# Patient Record
Sex: FEMALE | Race: WHITE | NOT HISPANIC OR LATINO | Employment: FULL TIME | URBAN - METROPOLITAN AREA
[De-identification: names, ages, dates, MRNs, and addresses within clinical notes are randomized per-mention and may not be internally consistent; named-entity substitution may affect disease eponyms.]

---

## 2017-06-13 ENCOUNTER — APPOINTMENT (OUTPATIENT)
Dept: WOMENS IMAGING | Facility: HOSPITAL | Age: 42
End: 2017-06-13

## 2017-06-13 PROCEDURE — 77067 SCR MAMMO BI INCL CAD: CPT | Performed by: RADIOLOGY

## 2017-06-13 PROCEDURE — 77063 BREAST TOMOSYNTHESIS BI: CPT | Performed by: RADIOLOGY

## 2019-12-06 ENCOUNTER — LAB REQUISITION (OUTPATIENT)
Dept: LAB | Facility: OTHER | Age: 44
End: 2019-12-06

## 2019-12-06 DIAGNOSIS — Z02.1 PRE-EMPLOYMENT EXAMINATION: ICD-10-CM

## 2019-12-06 LAB
ALBUMIN SERPL-MCNC: 4.9 G/DL (ref 3.5–5.2)
ALBUMIN SERPL-MCNC: 4.9 G/DL (ref 3.5–5.2)
ALBUMIN/GLOB SERPL: 1.8 G/DL
ALP SERPL-CCNC: 34 U/L (ref 39–117)
ALP SERPL-CCNC: 34 U/L (ref 39–117)
ALT SERPL W P-5'-P-CCNC: 17 U/L (ref 1–33)
ALT SERPL W P-5'-P-CCNC: 17 U/L (ref 1–33)
AST SERPL-CCNC: 17 U/L (ref 1–32)
AST SERPL-CCNC: 17 U/L (ref 1–32)
BACTERIA UR QL AUTO: ABNORMAL /HPF
BASOPHILS # BLD AUTO: 0.04 10*3/MM3 (ref 0–0.2)
BASOPHILS NFR BLD AUTO: 0.8 % (ref 0–1.5)
BILIRUB CONJ SERPL-MCNC: <0.2 MG/DL (ref 0.2–0.3)
BILIRUB CONJ SERPL-MCNC: <0.2 MG/DL (ref 0.2–0.3)
BILIRUB INDIRECT SERPL-MCNC: ABNORMAL MG/DL
BILIRUB SERPL-MCNC: 0.4 MG/DL (ref 0.2–1.2)
BILIRUB SERPL-MCNC: 0.4 MG/DL (ref 0.2–1.2)
BILIRUB UR QL STRIP: NEGATIVE
BUN BLD-MCNC: 10 MG/DL (ref 6–20)
CALCIUM SPEC-SCNC: 9.4 MG/DL (ref 8.6–10.5)
CHLORIDE SERPL-SCNC: 99 MMOL/L (ref 98–107)
CHOLEST SERPL-MCNC: 194 MG/DL (ref 0–200)
CLARITY UR: CLEAR
CO2 SERPL-SCNC: 27.7 MMOL/L (ref 22–29)
COLOR UR: YELLOW
CREAT BLD-MCNC: 0.76 MG/DL (ref 0.57–1)
DEPRECATED RDW RBC AUTO: 41.6 FL (ref 37–54)
EOSINOPHIL # BLD AUTO: 0.11 10*3/MM3 (ref 0–0.4)
EOSINOPHIL NFR BLD AUTO: 2.2 % (ref 0.3–6.2)
ERYTHROCYTE [DISTWIDTH] IN BLOOD BY AUTOMATED COUNT: 12.8 % (ref 12.3–15.4)
GFR SERPL CREATININE-BSD FRML MDRD: 82 ML/MIN/1.73
GGT SERPL-CCNC: 32 U/L (ref 5–36)
GLOBULIN UR ELPH-MCNC: 2.7 GM/DL
GLUCOSE BLD-MCNC: 88 MG/DL (ref 65–99)
GLUCOSE UR STRIP-MCNC: NEGATIVE MG/DL
HCT VFR BLD AUTO: 41.2 % (ref 34–46.6)
HDLC SERPL-MCNC: 72 MG/DL (ref 40–60)
HGB BLD-MCNC: 14 G/DL (ref 12–15.9)
HGB UR QL STRIP.AUTO: ABNORMAL
HYALINE CASTS UR QL AUTO: ABNORMAL /LPF
IMM GRANULOCYTES # BLD AUTO: 0.02 10*3/MM3 (ref 0–0.05)
IMM GRANULOCYTES NFR BLD AUTO: 0.4 % (ref 0–0.5)
IRON 24H UR-MRATE: 155 MCG/DL (ref 37–145)
KETONES UR QL STRIP: NEGATIVE
LDH SERPL-CCNC: 210 U/L (ref 135–214)
LDLC SERPL CALC-MCNC: 110 MG/DL (ref 0–100)
LDLC/HDLC SERPL: 1.53 {RATIO}
LEUKOCYTE ESTERASE UR QL STRIP.AUTO: ABNORMAL
LYMPHOCYTES # BLD AUTO: 1.2 10*3/MM3 (ref 0.7–3.1)
LYMPHOCYTES NFR BLD AUTO: 24.3 % (ref 19.6–45.3)
MCH RBC QN AUTO: 30.4 PG (ref 26.6–33)
MCHC RBC AUTO-ENTMCNC: 34 G/DL (ref 31.5–35.7)
MCV RBC AUTO: 89.6 FL (ref 79–97)
MONOCYTES # BLD AUTO: 0.34 10*3/MM3 (ref 0.1–0.9)
MONOCYTES NFR BLD AUTO: 6.9 % (ref 5–12)
NEUTROPHILS # BLD AUTO: 3.23 10*3/MM3 (ref 1.7–7)
NEUTROPHILS NFR BLD AUTO: 65.4 % (ref 42.7–76)
NITRITE UR QL STRIP: NEGATIVE
NRBC BLD AUTO-RTO: 0 /100 WBC (ref 0–0.2)
PH UR STRIP.AUTO: 7.5 [PH] (ref 5–8)
PHOSPHATE SERPL-MCNC: 3.7 MG/DL (ref 2.5–4.5)
PLATELET # BLD AUTO: 334 10*3/MM3 (ref 140–450)
PMV BLD AUTO: 10.4 FL (ref 6–12)
POTASSIUM BLD-SCNC: 4 MMOL/L (ref 3.5–5.2)
PROT SERPL-MCNC: 7.6 G/DL (ref 6–8.5)
PROT SERPL-MCNC: 7.6 G/DL (ref 6–8.5)
PROT UR QL STRIP: NEGATIVE
RBC # BLD AUTO: 4.6 10*6/MM3 (ref 3.77–5.28)
RBC # UR: ABNORMAL /HPF
REF LAB TEST METHOD: ABNORMAL
SODIUM BLD-SCNC: 138 MMOL/L (ref 136–145)
SP GR UR STRIP: 1.01 (ref 1–1.03)
SQUAMOUS #/AREA URNS HPF: ABNORMAL /HPF
TRIGL SERPL-MCNC: 60 MG/DL (ref 0–150)
URATE SERPL-MCNC: 4 MG/DL (ref 2.4–5.7)
UROBILINOGEN UR QL STRIP: ABNORMAL
VLDLC SERPL-MCNC: 12 MG/DL (ref 5–40)
WBC NRBC COR # BLD: 4.94 10*3/MM3 (ref 3.4–10.8)
WBC UR QL AUTO: ABNORMAL /HPF

## 2019-12-06 PROCEDURE — 83825 ASSAY OF MERCURY: CPT | Performed by: EMERGENCY MEDICINE

## 2019-12-06 PROCEDURE — 85025 COMPLETE CBC W/AUTO DIFF WBC: CPT | Performed by: EMERGENCY MEDICINE

## 2019-12-06 PROCEDURE — 80053 COMPREHEN METABOLIC PANEL: CPT | Performed by: EMERGENCY MEDICINE

## 2019-12-06 PROCEDURE — 82175 ASSAY OF ARSENIC: CPT | Performed by: EMERGENCY MEDICINE

## 2019-12-06 PROCEDURE — 80076 HEPATIC FUNCTION PANEL: CPT | Performed by: EMERGENCY MEDICINE

## 2019-12-06 PROCEDURE — 83655 ASSAY OF LEAD: CPT | Performed by: EMERGENCY MEDICINE

## 2019-12-06 PROCEDURE — 80061 LIPID PANEL: CPT | Performed by: EMERGENCY MEDICINE

## 2019-12-06 PROCEDURE — 81001 URINALYSIS AUTO W/SCOPE: CPT | Performed by: EMERGENCY MEDICINE

## 2019-12-10 LAB
ARSENIC BLD-MCNC: 5 UG/L (ref 2–23)
LEAD BLD-MCNC: 1 UG/DL (ref 0–4)
MERCURY BLD-MCNC: NORMAL UG/L (ref 0–14.9)

## 2020-10-06 ENCOUNTER — LAB (OUTPATIENT)
Dept: LAB | Facility: HOSPITAL | Age: 45
End: 2020-10-06

## 2020-10-06 ENCOUNTER — OFFICE VISIT (OUTPATIENT)
Dept: INTERNAL MEDICINE | Facility: CLINIC | Age: 45
End: 2020-10-06

## 2020-10-06 VITALS
HEART RATE: 90 BPM | WEIGHT: 159 LBS | HEIGHT: 67 IN | SYSTOLIC BLOOD PRESSURE: 164 MMHG | DIASTOLIC BLOOD PRESSURE: 100 MMHG | BODY MASS INDEX: 24.96 KG/M2 | OXYGEN SATURATION: 100 %

## 2020-10-06 DIAGNOSIS — Z00.00 HEALTHCARE MAINTENANCE: ICD-10-CM

## 2020-10-06 DIAGNOSIS — J30.1 SEASONAL ALLERGIC RHINITIS DUE TO POLLEN: ICD-10-CM

## 2020-10-06 DIAGNOSIS — F41.9 ANXIETY: Primary | ICD-10-CM

## 2020-10-06 DIAGNOSIS — Z23 NEED FOR INFLUENZA VACCINATION: ICD-10-CM

## 2020-10-06 DIAGNOSIS — I10 ESSENTIAL HYPERTENSION: ICD-10-CM

## 2020-10-06 LAB
ALBUMIN SERPL-MCNC: 4.7 G/DL (ref 3.5–5.2)
ALBUMIN/GLOB SERPL: 1.7 G/DL
ALP SERPL-CCNC: 41 U/L (ref 39–117)
ALT SERPL W P-5'-P-CCNC: 18 U/L (ref 1–33)
ANION GAP SERPL CALCULATED.3IONS-SCNC: 8.9 MMOL/L (ref 5–15)
AST SERPL-CCNC: 18 U/L (ref 1–32)
BASOPHILS # BLD AUTO: 0.05 10*3/MM3 (ref 0–0.2)
BASOPHILS NFR BLD AUTO: 0.9 % (ref 0–1.5)
BILIRUB SERPL-MCNC: 0.5 MG/DL (ref 0–1.2)
BUN SERPL-MCNC: 10 MG/DL (ref 6–20)
BUN/CREAT SERPL: 11.5 (ref 7–25)
CALCIUM SPEC-SCNC: 10 MG/DL (ref 8.6–10.5)
CHLORIDE SERPL-SCNC: 102 MMOL/L (ref 98–107)
CHOLEST SERPL-MCNC: 218 MG/DL (ref 0–200)
CO2 SERPL-SCNC: 28.1 MMOL/L (ref 22–29)
CREAT SERPL-MCNC: 0.87 MG/DL (ref 0.57–1)
DEPRECATED RDW RBC AUTO: 38.4 FL (ref 37–54)
EOSINOPHIL # BLD AUTO: 0.09 10*3/MM3 (ref 0–0.4)
EOSINOPHIL NFR BLD AUTO: 1.6 % (ref 0.3–6.2)
ERYTHROCYTE [DISTWIDTH] IN BLOOD BY AUTOMATED COUNT: 12 % (ref 12.3–15.4)
GFR SERPL CREATININE-BSD FRML MDRD: 70 ML/MIN/1.73
GLOBULIN UR ELPH-MCNC: 2.7 GM/DL
GLUCOSE SERPL-MCNC: 84 MG/DL (ref 65–99)
HCT VFR BLD AUTO: 43.5 % (ref 34–46.6)
HCV AB SER DONR QL: NORMAL
HDLC SERPL-MCNC: 88 MG/DL (ref 40–60)
HGB BLD-MCNC: 15.1 G/DL (ref 12–15.9)
IMM GRANULOCYTES # BLD AUTO: 0.02 10*3/MM3 (ref 0–0.05)
IMM GRANULOCYTES NFR BLD AUTO: 0.3 % (ref 0–0.5)
LDLC SERPL CALC-MCNC: 118 MG/DL (ref 0–100)
LDLC/HDLC SERPL: 1.34 {RATIO}
LYMPHOCYTES # BLD AUTO: 0.86 10*3/MM3 (ref 0.7–3.1)
LYMPHOCYTES NFR BLD AUTO: 15 % (ref 19.6–45.3)
MAGNESIUM SERPL-MCNC: 2.1 MG/DL (ref 1.6–2.6)
MCH RBC QN AUTO: 30.8 PG (ref 26.6–33)
MCHC RBC AUTO-ENTMCNC: 34.7 G/DL (ref 31.5–35.7)
MCV RBC AUTO: 88.8 FL (ref 79–97)
MONOCYTES # BLD AUTO: 0.42 10*3/MM3 (ref 0.1–0.9)
MONOCYTES NFR BLD AUTO: 7.3 % (ref 5–12)
NEUTROPHILS NFR BLD AUTO: 4.28 10*3/MM3 (ref 1.7–7)
NEUTROPHILS NFR BLD AUTO: 74.9 % (ref 42.7–76)
NRBC BLD AUTO-RTO: 0 /100 WBC (ref 0–0.2)
PLATELET # BLD AUTO: 317 10*3/MM3 (ref 140–450)
PMV BLD AUTO: 10 FL (ref 6–12)
POTASSIUM SERPL-SCNC: 4 MMOL/L (ref 3.5–5.2)
PROT SERPL-MCNC: 7.4 G/DL (ref 6–8.5)
RBC # BLD AUTO: 4.9 10*6/MM3 (ref 3.77–5.28)
SODIUM SERPL-SCNC: 139 MMOL/L (ref 136–145)
T4 FREE SERPL-MCNC: 1.31 NG/DL (ref 0.93–1.7)
TRIGL SERPL-MCNC: 60 MG/DL (ref 0–150)
TSH SERPL DL<=0.05 MIU/L-ACNC: 1.26 UIU/ML (ref 0.27–4.2)
VLDLC SERPL-MCNC: 12 MG/DL (ref 5–40)
WBC # BLD AUTO: 5.72 10*3/MM3 (ref 3.4–10.8)

## 2020-10-06 PROCEDURE — 85025 COMPLETE CBC W/AUTO DIFF WBC: CPT | Performed by: FAMILY MEDICINE

## 2020-10-06 PROCEDURE — 86803 HEPATITIS C AB TEST: CPT | Performed by: FAMILY MEDICINE

## 2020-10-06 PROCEDURE — 99214 OFFICE O/P EST MOD 30 MIN: CPT | Performed by: FAMILY MEDICINE

## 2020-10-06 PROCEDURE — 83735 ASSAY OF MAGNESIUM: CPT | Performed by: FAMILY MEDICINE

## 2020-10-06 PROCEDURE — 84439 ASSAY OF FREE THYROXINE: CPT | Performed by: FAMILY MEDICINE

## 2020-10-06 PROCEDURE — 90686 IIV4 VACC NO PRSV 0.5 ML IM: CPT | Performed by: FAMILY MEDICINE

## 2020-10-06 PROCEDURE — 36415 COLL VENOUS BLD VENIPUNCTURE: CPT | Performed by: FAMILY MEDICINE

## 2020-10-06 PROCEDURE — 80053 COMPREHEN METABOLIC PANEL: CPT | Performed by: FAMILY MEDICINE

## 2020-10-06 PROCEDURE — 84443 ASSAY THYROID STIM HORMONE: CPT | Performed by: FAMILY MEDICINE

## 2020-10-06 PROCEDURE — 80061 LIPID PANEL: CPT | Performed by: FAMILY MEDICINE

## 2020-10-06 PROCEDURE — 99386 PREV VISIT NEW AGE 40-64: CPT | Performed by: FAMILY MEDICINE

## 2020-10-06 PROCEDURE — 90471 IMMUNIZATION ADMIN: CPT | Performed by: FAMILY MEDICINE

## 2020-10-06 RX ORDER — FLUTICASONE PROPIONATE 50 MCG
1 SPRAY, SUSPENSION (ML) NASAL DAILY
COMMUNITY

## 2020-10-06 RX ORDER — DIPHENHYDRAMINE HCL 25 MG
1 CAPSULE ORAL NIGHTLY
COMMUNITY

## 2020-10-06 RX ORDER — VENLAFAXINE HYDROCHLORIDE 37.5 MG/1
37.5 CAPSULE, EXTENDED RELEASE ORAL DAILY
Qty: 21 CAPSULE | Refills: 0 | Status: SHIPPED | OUTPATIENT
Start: 2020-10-06 | End: 2020-11-22 | Stop reason: SINTOL

## 2020-10-06 RX ORDER — ATENOLOL 25 MG/1
12.5 TABLET ORAL DAILY
Qty: 45 TABLET | Refills: 2 | Status: SHIPPED | OUTPATIENT
Start: 2020-10-06 | End: 2021-03-15 | Stop reason: SDUPTHER

## 2020-10-06 RX ORDER — VENLAFAXINE HYDROCHLORIDE 75 MG/1
75 CAPSULE, EXTENDED RELEASE ORAL DAILY
Qty: 30 CAPSULE | Refills: 6 | Status: SHIPPED | OUTPATIENT
Start: 2020-10-27 | End: 2020-11-22 | Stop reason: SINTOL

## 2020-10-06 NOTE — PROGRESS NOTES
Subjective   Alla Hinson is a 44 y.o. female.     Chief Complaint   Patient presents with   • Anxiety   Healthcare maintenance      History of Present Illness   Alla Hinson 44 y.o. female who presents for an Annual Wellness Visit.  she has a history of   Patient Active Problem List   Diagnosis   • Essential hypertension   • Anxiety   • Eczema   • Seasonal allergic rhinitis due to pollen   • Healthcare maintenance   .  she has been feeling anxious.  Labs results discussed in detail with the patient.  Plan to update vaccines if needed today.  I  reviewed health maintenance with her as part of my preventative care plan.    Health Habits:  Dental Exam. up to date  Eye Exam. up to date  Exercise: 0 times/week.  Current exercise activities include: outdoor activity  \    The following portions of the patient's history were reviewed and updated as appropriate: allergies, current medications, past family history, past medical history, past social history, past surgical history and problem list.    Review of Systems   Constitutional: Negative.    HENT: Negative.    Respiratory: Negative.    Cardiovascular: Positive for palpitations.   Gastrointestinal: Negative.    Genitourinary: Negative.    Musculoskeletal: Negative.    Skin: Positive for rash.   Neurological: Negative.    Psychiatric/Behavioral: Positive for decreased concentration. The patient is nervous/anxious.        Objective   Physical Exam  Vitals signs and nursing note reviewed.   Constitutional:       Appearance: Normal appearance.   HENT:      Head: Normocephalic and atraumatic.      Right Ear: Tympanic membrane and ear canal normal.      Left Ear: Tympanic membrane and ear canal normal.   Eyes:      Extraocular Movements: Extraocular movements intact.      Pupils: Pupils are equal, round, and reactive to light.   Neck:      Musculoskeletal: No muscular tenderness.      Vascular: No carotid bruit.   Cardiovascular:      Rate and Rhythm: Normal rate  and regular rhythm.      Pulses: Normal pulses.   Pulmonary:      Effort: Pulmonary effort is normal.      Breath sounds: Normal breath sounds.   Musculoskeletal:      Right lower leg: No edema.      Left lower leg: No edema.   Skin:     General: Skin is warm and dry.   Neurological:      General: No focal deficit present.      Mental Status: She is alert.   Psychiatric:         Mood and Affect: Mood normal.         Behavior: Behavior normal.         Thought Content: Thought content normal.         Judgment: Judgment normal.         Assessment/Plan   Alla was seen today for anxiety.    Diagnoses and all orders for this visit:    Anxiety  -     CBC & Differential  -     venlafaxine XR (Effexor XR) 37.5 MG 24 hr capsule; Take 1 capsule by mouth Daily.  -     venlafaxine XR (Effexor XR) 75 MG 24 hr capsule; Take 1 capsule by mouth Daily.  -     atenolol (Tenormin) 25 MG tablet; Take 0.5 tablets by mouth Daily.    Essential hypertension  -     Comprehensive Metabolic Panel  -     Magnesium  -     TSH  -     T4, Free  -     atenolol (Tenormin) 25 MG tablet; Take 0.5 tablets by mouth Daily.    Seasonal allergic rhinitis due to pollen    Healthcare maintenance  -     Lipid Panel  -     Hepatitis C Antibody  -     Cologuard - Stool, Per Rectum; Future    Other orders  -     Fluarix/Fluzone/Afluria Quad>6 Months      Any attempts at healthy lifestyle with calorie appropriate diet regular physical activity preventative services  She has mammogram and Pap smear through gynecology

## 2020-10-06 NOTE — PROGRESS NOTES
Alla Hinson is a 44 y.o. female.      Assessment/Plan   Problem List Items Addressed This Visit        Cardiovascular and Mediastinum    Essential hypertension    Relevant Medications    atenolol (Tenormin) 25 MG tablet    Other Relevant Orders    Comprehensive Metabolic Panel    Magnesium    TSH    T4, Free       Respiratory    Seasonal allergic rhinitis due to pollen       Other    Anxiety - Primary    Relevant Medications    venlafaxine XR (Effexor XR) 37.5 MG 24 hr capsule    venlafaxine XR (Effexor XR) 75 MG 24 hr capsule (Start on 10/27/2020)    atenolol (Tenormin) 25 MG tablet    Other Relevant Orders    CBC & Differential    Healthcare maintenance    Relevant Orders    Lipid Panel    Hepatitis C Antibody    Cologuard - Stool, Per Rectum         Start venlafaxine 37.5 mg daily for 3 weeks then increase to 75 mg daily follow-up results of blood work  Low needs and Benadryl for rhinitis  Low-dose atenolol in concert with venlafaxine monitor blood pressure with a goal less than 140/90    Return in about 1 month (around 11/6/2020), or if symptoms worsen or fail to improve, for Recheck.      Chief Complaint   Patient presents with   • Anxiety   Hypertension, rhinitis  Social History     Tobacco Use   • Smoking status: Former Smoker   • Smokeless tobacco: Never Used   Substance Use Topics   • Alcohol use: Yes     Comment: 3-4 week   • Drug use: Not on file     New problem  Anxiety  Presents for initial visit. Onset was 1 to 6 months ago. The problem has been gradually worsening. Symptoms include confusion, decreased concentration, excessive worry, irritability, nervous/anxious behavior and palpitations. Symptoms occur most days. The severity of symptoms is causing significant distress. The symptoms are aggravated by work stress and social activities. The quality of sleep is good. Nighttime awakenings: none.     Risk factors include family history. Past treatments include nothing. The treatment provided mild  "relief. Compliance with prior treatments has been variable.   Hypertension no medication no chest pain shortness of breath some intermittent heartbeat had a prescreening employment EKG last September was notified as being normal  She has seasonal allergies using mostly Flonase Benadryl occasionally at bedtime seems to give her some good relief tao benefit of long-term nonsedating antihistamines as well    The following portions of the patient's history were reviewed and updated as appropriate:PMHroutine: Social history , Allergies, Current Medications, Active Problem List and Health Maintenance    Review of Systems   Constitutional: Positive for irritability.   HENT: Negative.    Cardiovascular: Positive for palpitations.   Gastrointestinal: Negative.    Genitourinary: Negative.    Musculoskeletal: Negative.    Skin: Positive for rash.        Rash with embarrassment   Neurological: Negative.    Psychiatric/Behavioral: Positive for confusion and decreased concentration. The patient is nervous/anxious.        Objective   Vitals:    10/06/20 0840   BP: 164/100   BP Location: Left arm   Patient Position: Sitting   Cuff Size: Adult   Pulse: 90   SpO2: 100%   Weight: 72.1 kg (159 lb)   Height: 170.2 cm (67\")     Body mass index is 24.9 kg/m².  Physical Exam  Vitals signs and nursing note reviewed.   Constitutional:       Appearance: Normal appearance.   HENT:      Head: Normocephalic and atraumatic.      Right Ear: Tympanic membrane and ear canal normal.      Left Ear: Ear canal normal.   Eyes:      General: No scleral icterus.  Neck:      Vascular: No carotid bruit.      Comments: No thyroid nodules or enlargement noted  Cardiovascular:      Rate and Rhythm: Normal rate and regular rhythm.      Pulses: Normal pulses.      Heart sounds: No murmur.   Pulmonary:      Breath sounds: Normal breath sounds.   Abdominal:      Tenderness: There is no abdominal tenderness.   Musculoskeletal:      Left lower leg: No edema. "   Skin:     General: Skin is warm and dry.   Neurological:      General: No focal deficit present.      Mental Status: She is alert and oriented to person, place, and time.   Psychiatric:         Mood and Affect: Mood normal.         Behavior: Behavior normal.         Thought Content: Thought content normal.         Judgment: Judgment normal.       Reviewed Data:  No visits with results within 1 Month(s) from this visit.   Latest known visit with results is:   Lab Requisition on 12/06/2019   Component Date Value Ref Range Status   • Lead 12/06/2019 1  0 - 4 ug/dL Final    Testing performed by Inductively coupled plasma/Mass Spectrometry.                            Environmental Exposure:                             WHO Recommendation    <20                            Occupational Exposure:                             OSHA Lead Std          40                             [employer]AL[employer]                    30                                  Detection Limit =  1  This test was developed and its performance  characteristics determined by [facility]. It has not been  cleared or approved by the Food and Drug Administration.   • Arsenic 12/06/2019 5  2 - 23 ug/L Final                                    Detection Limit = 1   • Mercury 12/06/2019 None Detected  0.0 - 14.9 ug/L Final                            Environmental Exposure:  <15.0                          Occupational Exposure:                           [employer]AL[employer] - Inorganic Mercury: 15.0                                  Detection Limit =  1.0   • Total Protein 12/06/2019 7.6  6.0 - 8.5 g/dL Final   • Albumin 12/06/2019 4.90  3.50 - 5.20 g/dL Final   • ALT (SGPT) 12/06/2019 17  1 - 33 U/L Final   • AST (SGOT) 12/06/2019 17  1 - 32 U/L Final   • Alkaline Phosphatase 12/06/2019 34* 39 - 117 U/L Final   • Total Bilirubin 12/06/2019 0.4  0.2 - 1.2 mg/dL Final   • Bilirubin, Direct 12/06/2019 <0.2* 0.2 - 0.3 mg/dL Final   • Bilirubin, Indirect 12/06/2019    Final    Unable to calculate   •  Glucose 12/06/2019 88  65 - 99 mg/dL Final   • BUN 12/06/2019 10  6 - 20 mg/dL Final   • Sodium 12/06/2019 138  136 - 145 mmol/L Final   • Potassium 12/06/2019 4.0  3.5 - 5.2 mmol/L Final   • Chloride 12/06/2019 99  98 - 107 mmol/L Final   • CO2 12/06/2019 27.7  22.0 - 29.0 mmol/L Final   • Calcium 12/06/2019 9.4  8.6 - 10.5 mg/dL Final   • Albumin 12/06/2019 4.90  3.50 - 5.20 g/dL Final   • Total Protein 12/06/2019 7.6  6.0 - 8.5 g/dL Final   • AST (SGOT) 12/06/2019 17  1 - 32 U/L Final   • ALT (SGPT) 12/06/2019 17  1 - 33 U/L Final   • Alkaline Phosphatase 12/06/2019 34* 39 - 117 U/L Final   • Total Bilirubin 12/06/2019 0.4  0.2 - 1.2 mg/dL Final   • Bilirubin, Direct 12/06/2019 <0.2* 0.2 - 0.3 mg/dL Final   • Iron 12/06/2019 155* 37 - 145 mcg/dL Final   • GGT 12/06/2019 32  5 - 36 U/L Final   • Phosphorus 12/06/2019 3.7  2.5 - 4.5 mg/dL Final   • LDH 12/06/2019 210  135 - 214 U/L Final   • Uric Acid 12/06/2019 4.0  2.4 - 5.7 mg/dL Final   • eGFR Non African Amer 12/06/2019 82  >60 mL/min/1.73 Final   • Creatinine 12/06/2019 0.76  0.57 - 1.00 mg/dL Final   • Globulin 12/06/2019 2.7  gm/dL Final   • A/G Ratio 12/06/2019 1.8  g/dL Final   • Total Cholesterol 12/06/2019 194  0 - 200 mg/dL Final   • Triglycerides 12/06/2019 60  0 - 150 mg/dL Final   • HDL Cholesterol 12/06/2019 72* 40 - 60 mg/dL Final   • LDL Cholesterol  12/06/2019 110* 0 - 100 mg/dL Final   • VLDL Cholesterol 12/06/2019 12  5 - 40 mg/dL Final   • LDL/HDL Ratio 12/06/2019 1.53   Final   • WBC 12/06/2019 4.94  3.40 - 10.80 10*3/mm3 Final   • RBC 12/06/2019 4.60  3.77 - 5.28 10*6/mm3 Final   • Hemoglobin 12/06/2019 14.0  12.0 - 15.9 g/dL Final   • Hematocrit 12/06/2019 41.2  34.0 - 46.6 % Final   • MCV 12/06/2019 89.6  79.0 - 97.0 fL Final   • MCH 12/06/2019 30.4  26.6 - 33.0 pg Final   • MCHC 12/06/2019 34.0  31.5 - 35.7 g/dL Final   • RDW 12/06/2019 12.8  12.3 - 15.4 % Final   • RDW-SD 12/06/2019 41.6  37.0 - 54.0 fl Final   • MPV 12/06/2019 10.4   6.0 - 12.0 fL Final   • Platelets 12/06/2019 334  140 - 450 10*3/mm3 Final   • Neutrophil % 12/06/2019 65.4  42.7 - 76.0 % Final   • Lymphocyte % 12/06/2019 24.3  19.6 - 45.3 % Final   • Monocyte % 12/06/2019 6.9  5.0 - 12.0 % Final   • Eosinophil % 12/06/2019 2.2  0.3 - 6.2 % Final   • Basophil % 12/06/2019 0.8  0.0 - 1.5 % Final   • Immature Grans % 12/06/2019 0.4  0.0 - 0.5 % Final   • Neutrophils, Absolute 12/06/2019 3.23  1.70 - 7.00 10*3/mm3 Final   • Lymphocytes, Absolute 12/06/2019 1.20  0.70 - 3.10 10*3/mm3 Final   • Monocytes, Absolute 12/06/2019 0.34  0.10 - 0.90 10*3/mm3 Final   • Eosinophils, Absolute 12/06/2019 0.11  0.00 - 0.40 10*3/mm3 Final   • Basophils, Absolute 12/06/2019 0.04  0.00 - 0.20 10*3/mm3 Final   • Immature Grans, Absolute 12/06/2019 0.02  0.00 - 0.05 10*3/mm3 Final   • nRBC 12/06/2019 0.0  0.0 - 0.2 /100 WBC Final   • Color, UA 12/06/2019 Yellow  Yellow, Straw Final   • Appearance, UA 12/06/2019 Clear  Clear Final   • pH, UA 12/06/2019 7.5  5.0 - 8.0 Final   • Specific Gravity, UA 12/06/2019 1.012  1.005 - 1.030 Final   • Glucose, UA 12/06/2019 Negative  Negative Final   • Ketones, UA 12/06/2019 Negative  Negative Final   • Bilirubin, UA 12/06/2019 Negative  Negative Final   • Blood, UA 12/06/2019 Small (1+)* Negative Final   • Protein, UA 12/06/2019 Negative  Negative Final   • Leuk Esterase, UA 12/06/2019 Moderate (2+)* Negative Final   • Nitrite, UA 12/06/2019 Negative  Negative Final   • Urobilinogen, UA 12/06/2019 0.2 E.U./dL  0.2 - 1.0 E.U./dL Final   • RBC, UA 12/06/2019 3-5* None Seen, 0-2 /HPF Final   • WBC, UA 12/06/2019 3-5* None Seen, 0-2 /HPF Final   • Bacteria, UA 12/06/2019 2+* None Seen /HPF Final   • Squamous Epithelial Cells, UA 12/06/2019 7-12* None Seen, 0-2 /HPF Final   • Hyaline Casts, UA 12/06/2019 3-6  None Seen /LPF Final   • Methodology 12/06/2019 Manual Light Microscopy   Final

## 2020-10-07 ENCOUNTER — RESULTS ENCOUNTER (OUTPATIENT)
Dept: INTERNAL MEDICINE | Facility: CLINIC | Age: 45
End: 2020-10-07

## 2020-10-07 DIAGNOSIS — Z00.00 HEALTHCARE MAINTENANCE: ICD-10-CM

## 2020-11-19 ENCOUNTER — TELEPHONE (OUTPATIENT)
Dept: INTERNAL MEDICINE | Facility: CLINIC | Age: 45
End: 2020-11-19

## 2020-11-19 NOTE — TELEPHONE ENCOUNTER
"----- Message from Alla Hinson sent at 11/19/2020  2:31 PM EST -----  Regarding: Prescription Question  Contact: [phone]  Hello, I have been taking Venlafaxine since 10/8. I started with 21 capsules at 37.5 mg and then the Rx bumped up to 75 mg. Since I have been taking the stronger Rx dosage, I feel a little \"spaced out\", jittery, having trouble with focus, and also experiencing some difficulty falling asleep at night. (I have been taking the pills once a day in the morning.) At this time I do think the Rx is helping reduce my anxiety to some degree. I have also been taking the blood pressure meds Atenolol 25 mg at night, as well as benedryl for allergies and melatonin for sleep. I was wondering if maybe my dosage on the venlafaxine may need to be reduced to help with the symptoms? The Rx is capsules so I can't split them in half. Thanks in advance!  "

## 2020-11-22 DIAGNOSIS — F41.9 ANXIETY: ICD-10-CM

## 2020-11-22 RX ORDER — VENLAFAXINE HYDROCHLORIDE 37.5 MG/1
37.5 CAPSULE, EXTENDED RELEASE ORAL DAILY
Qty: 30 CAPSULE | Refills: 6 | Status: SHIPPED | OUTPATIENT
Start: 2020-11-22 | End: 2021-06-21

## 2021-03-15 DIAGNOSIS — I10 ESSENTIAL HYPERTENSION: ICD-10-CM

## 2021-03-15 DIAGNOSIS — F41.9 ANXIETY: ICD-10-CM

## 2021-03-15 RX ORDER — ATENOLOL 25 MG/1
12.5 TABLET ORAL DAILY
Qty: 45 TABLET | Refills: 2 | Status: SHIPPED | OUTPATIENT
Start: 2021-03-15 | End: 2021-05-11 | Stop reason: SDUPTHER

## 2021-04-06 ENCOUNTER — BULK ORDERING (OUTPATIENT)
Dept: CASE MANAGEMENT | Facility: OTHER | Age: 46
End: 2021-04-06

## 2021-04-06 DIAGNOSIS — Z23 IMMUNIZATION DUE: ICD-10-CM

## 2021-05-11 DIAGNOSIS — I10 ESSENTIAL HYPERTENSION: ICD-10-CM

## 2021-05-11 DIAGNOSIS — F41.9 ANXIETY: ICD-10-CM

## 2021-05-11 RX ORDER — ATENOLOL 25 MG/1
25 TABLET ORAL DAILY
Qty: 90 TABLET | Refills: 2 | Status: SHIPPED | OUTPATIENT
Start: 2021-05-11 | End: 2022-02-14

## 2021-06-21 DIAGNOSIS — F41.9 ANXIETY: ICD-10-CM

## 2021-06-21 RX ORDER — VENLAFAXINE HYDROCHLORIDE 37.5 MG/1
CAPSULE, EXTENDED RELEASE ORAL
Qty: 30 CAPSULE | Refills: 5 | Status: SHIPPED | OUTPATIENT
Start: 2021-06-21 | End: 2021-12-21

## 2021-12-21 DIAGNOSIS — F41.9 ANXIETY: ICD-10-CM

## 2021-12-21 RX ORDER — VENLAFAXINE HYDROCHLORIDE 37.5 MG/1
CAPSULE, EXTENDED RELEASE ORAL
Qty: 15 CAPSULE | Refills: 0 | Status: SHIPPED | OUTPATIENT
Start: 2021-12-21 | End: 2022-01-10 | Stop reason: SDUPTHER

## 2022-01-04 DIAGNOSIS — F41.9 ANXIETY: ICD-10-CM

## 2022-01-04 RX ORDER — VENLAFAXINE HYDROCHLORIDE 37.5 MG/1
CAPSULE, EXTENDED RELEASE ORAL
Qty: 15 CAPSULE | Refills: 0 | OUTPATIENT
Start: 2022-01-04

## 2022-01-10 DIAGNOSIS — F41.9 ANXIETY: ICD-10-CM

## 2022-01-10 RX ORDER — VENLAFAXINE HYDROCHLORIDE 37.5 MG/1
37.5 CAPSULE, EXTENDED RELEASE ORAL DAILY
Qty: 15 CAPSULE | Refills: 0 | Status: SHIPPED | OUTPATIENT
Start: 2022-01-10 | End: 2022-01-14 | Stop reason: ALTCHOICE

## 2022-01-10 RX ORDER — VENLAFAXINE HYDROCHLORIDE 37.5 MG/1
37.5 CAPSULE, EXTENDED RELEASE ORAL DAILY
Qty: 7 CAPSULE | Refills: 0 | Status: SHIPPED | OUTPATIENT
Start: 2022-01-10 | End: 2022-01-10 | Stop reason: SDUPTHER

## 2022-01-14 ENCOUNTER — OFFICE VISIT (OUTPATIENT)
Dept: INTERNAL MEDICINE | Facility: CLINIC | Age: 47
End: 2022-01-14

## 2022-01-14 ENCOUNTER — LAB (OUTPATIENT)
Dept: LAB | Facility: HOSPITAL | Age: 47
End: 2022-01-14

## 2022-01-14 VITALS
SYSTOLIC BLOOD PRESSURE: 168 MMHG | TEMPERATURE: 97.7 F | DIASTOLIC BLOOD PRESSURE: 96 MMHG | BODY MASS INDEX: 26.97 KG/M2 | OXYGEN SATURATION: 98 % | WEIGHT: 171.8 LBS | HEART RATE: 77 BPM | HEIGHT: 67 IN

## 2022-01-14 DIAGNOSIS — I10 ESSENTIAL HYPERTENSION: ICD-10-CM

## 2022-01-14 DIAGNOSIS — Z00.00 HEALTHCARE MAINTENANCE: ICD-10-CM

## 2022-01-14 DIAGNOSIS — F41.9 ANXIETY: Primary | ICD-10-CM

## 2022-01-14 LAB
25(OH)D3 SERPL-MCNC: 30 NG/ML (ref 30–100)
ALBUMIN SERPL-MCNC: 4.7 G/DL (ref 3.5–5.2)
ALBUMIN/GLOB SERPL: 2 G/DL
ALP SERPL-CCNC: 41 U/L (ref 39–117)
ALT SERPL W P-5'-P-CCNC: 33 U/L (ref 1–33)
ANION GAP SERPL CALCULATED.3IONS-SCNC: 10.5 MMOL/L (ref 5–15)
AST SERPL-CCNC: 25 U/L (ref 1–32)
BILIRUB SERPL-MCNC: 0.4 MG/DL (ref 0–1.2)
BUN SERPL-MCNC: 12 MG/DL (ref 6–20)
BUN/CREAT SERPL: 15.2 (ref 7–25)
CALCIUM SPEC-SCNC: 9.5 MG/DL (ref 8.6–10.5)
CHLORIDE SERPL-SCNC: 104 MMOL/L (ref 98–107)
CHOLEST SERPL-MCNC: 218 MG/DL (ref 0–200)
CO2 SERPL-SCNC: 26.5 MMOL/L (ref 22–29)
CREAT SERPL-MCNC: 0.79 MG/DL (ref 0.57–1)
GFR SERPL CREATININE-BSD FRML MDRD: 78 ML/MIN/1.73
GLOBULIN UR ELPH-MCNC: 2.3 GM/DL
GLUCOSE SERPL-MCNC: 82 MG/DL (ref 65–99)
HDLC SERPL-MCNC: 66 MG/DL (ref 40–60)
LDLC SERPL CALC-MCNC: 136 MG/DL (ref 0–100)
LDLC/HDLC SERPL: 2.03 {RATIO}
POTASSIUM SERPL-SCNC: 4.2 MMOL/L (ref 3.5–5.2)
PROT SERPL-MCNC: 7 G/DL (ref 6–8.5)
SODIUM SERPL-SCNC: 141 MMOL/L (ref 136–145)
T4 FREE SERPL-MCNC: 1.22 NG/DL (ref 0.93–1.7)
TRIGL SERPL-MCNC: 91 MG/DL (ref 0–150)
TSH SERPL DL<=0.05 MIU/L-ACNC: 1.77 UIU/ML (ref 0.27–4.2)
VLDLC SERPL-MCNC: 16 MG/DL (ref 5–40)

## 2022-01-14 PROCEDURE — 84443 ASSAY THYROID STIM HORMONE: CPT | Performed by: FAMILY MEDICINE

## 2022-01-14 PROCEDURE — 80053 COMPREHEN METABOLIC PANEL: CPT | Performed by: FAMILY MEDICINE

## 2022-01-14 PROCEDURE — 99396 PREV VISIT EST AGE 40-64: CPT | Performed by: FAMILY MEDICINE

## 2022-01-14 PROCEDURE — 36415 COLL VENOUS BLD VENIPUNCTURE: CPT | Performed by: FAMILY MEDICINE

## 2022-01-14 PROCEDURE — 99214 OFFICE O/P EST MOD 30 MIN: CPT | Performed by: FAMILY MEDICINE

## 2022-01-14 PROCEDURE — 84439 ASSAY OF FREE THYROXINE: CPT | Performed by: FAMILY MEDICINE

## 2022-01-14 PROCEDURE — 80061 LIPID PANEL: CPT | Performed by: FAMILY MEDICINE

## 2022-01-14 PROCEDURE — 82306 VITAMIN D 25 HYDROXY: CPT | Performed by: FAMILY MEDICINE

## 2022-01-14 RX ORDER — ATOMOXETINE 25 MG/1
25 CAPSULE ORAL DAILY
Qty: 30 CAPSULE | Refills: 1 | Status: SHIPPED | OUTPATIENT
Start: 2022-01-14 | End: 2022-02-14

## 2022-01-14 RX ORDER — AMLODIPINE BESYLATE 5 MG/1
5 TABLET ORAL DAILY
Qty: 30 TABLET | Refills: 3 | Status: SHIPPED | OUTPATIENT
Start: 2022-01-14 | End: 2022-02-14 | Stop reason: SDUPTHER

## 2022-01-14 NOTE — PROGRESS NOTES
Subjective   Alla Hinson is a 46 y.o. female.     Chief Complaint   Patient presents with   • Anxiety   • Hypertension     Health maintenance    History of Present Illness   Alla Hinson 46 y.o. female who presents for an Annual Wellness Visit.  she has a history of   Patient Active Problem List   Diagnosis   • Essential hypertension   • Anxiety   • Eczema   • Seasonal allergic rhinitis due to pollen   • Healthcare maintenance   .  she has been feeling anxious.  Labs results discussed in detail with the patient.  Plan to update vaccines if needed today.  I  reviewed health maintenance with her as part of my preventative care plan.    Health Habits:  Dental Exam. up to date  Eye Exam. up to date  Exercise: 0 times/week.  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    Review of Systems   Constitutional: Negative for appetite change, fever and unexpected weight change.   HENT: Negative for ear pain, facial swelling and sore throat.    Eyes: Negative for pain and visual disturbance.   Respiratory: Negative for chest tightness, shortness of breath and wheezing.    Cardiovascular: Negative for chest pain and palpitations.   Gastrointestinal: Negative for abdominal pain and blood in stool.   Endocrine: Negative.    Genitourinary: Negative for difficulty urinating and hematuria.   Musculoskeletal: Negative for joint swelling.   Neurological: Negative for tremors, seizures and syncope.   Hematological: Negative for adenopathy.   Psychiatric/Behavioral: The patient is nervous/anxious.        Objective   Physical Exam  Vitals and nursing note reviewed.   Constitutional:       Appearance: Normal appearance. She is well-developed. She is not diaphoretic.   HENT:       Head: Normocephalic and atraumatic.      Right Ear: Tympanic membrane, ear canal and external ear normal.      Left Ear: Tympanic membrane, ear canal and external ear normal.   Eyes:      General: Lids are normal. No scleral icterus.     Extraocular Movements: Extraocular movements intact.      Conjunctiva/sclera: Conjunctivae normal.   Neck:      Thyroid: No thyroid mass or thyromegaly.      Vascular: No carotid bruit or JVD.   Cardiovascular:      Rate and Rhythm: Normal rate and regular rhythm.      Pulses: Normal pulses.           Radial pulses are 2+ on the right side and 2+ on the left side.      Heart sounds: Normal heart sounds. No murmur heard.      Pulmonary:      Effort: Pulmonary effort is normal. No respiratory distress.      Breath sounds: Normal breath sounds.   Abdominal:      Palpations: Abdomen is soft.   Musculoskeletal:      Cervical back: Normal range of motion.      Right lower leg: No edema.      Left lower leg: No edema.   Skin:     General: Skin is warm and dry.      Coloration: Skin is not pale.      Findings: No erythema or rash.   Neurological:      General: No focal deficit present.      Mental Status: She is alert and oriented to person, place, and time.      Sensory: No sensory deficit.      Deep Tendon Reflexes: Reflexes are normal and symmetric.   Psychiatric:         Mood and Affect: Mood normal.         Behavior: Behavior normal. Behavior is cooperative.         Thought Content: Thought content normal.         Judgment: Judgment normal.         Assessment/Plan   Diagnoses and all orders for this visit:    1. Anxiety (Primary)  -     atomoxetine (Strattera) 25 MG capsule; Take 1 capsule by mouth Daily.  Dispense: 30 capsule; Refill: 1    2. Essential hypertension  -     Comprehensive Metabolic Panel  -     TSH  -     T4, Free  -     amLODIPine (NORVASC) 5 MG tablet; Take 1 tablet by mouth Daily.  Dispense: 30 tablet; Refill: 3    3. Healthcare maintenance  -     Lipid Panel  -      Vitamin D 25 Hydroxy      Continue times at healthy lifestyle calorie appropriate diet regular physical activity immunizations recommended prevent acute illness  Anticipatory guidance was provided regarding breast cancer screening with mammograms and Cologuard for colon cancer prevention  Follow-up preventatively annually and as needed or in 6 months for ongoing management of hypertension anxiety

## 2022-01-14 NOTE — PROGRESS NOTES
"Answers for HPI/ROS submitted by the patient on 1/13/2022  Please describe your symptoms.: Need to review anxiety medication  Have you had these symptoms before?: Yes  How long have you been having these symptoms?: Greater than 2 weeks  Please list any medications you are currently taking for this condition.: Venlafaxine 37.5  Please describe any probable cause for these symptoms. : Anxiety due to life stresses. Work stress and adjusting to \"new normal\" in pandemic era has improved. However still experiencing inability to focus on tasks and wondering if I may have an issue with ADD?  What is the primary reason for your visit?: Other    Chief Complaint  Anxiety and Hypertension    Subjective          Alla Hinson presents to [facility]  History of Present Illness  Patient follows up for ongoing management of hypertension and anxiety she feels she has not been able to focus at work and she is not really getting much benefit from venlafaxine sheath feels that she has some difficulty focusing although she does not have a longstanding history of ADD as a child.  She does not monitor  her blood pressure, she has no chest pain shortness of breath or increased fatigue  Objective   Vital Signs:   /96   Pulse 77   Temp 97.7 °F (36.5 °C)   Ht 170.2 cm (67\")   Wt 77.9 kg (171 lb 12.8 oz)   SpO2 98%   BMI 26.91 kg/m²     Physical Exam   Result Review :       ElectrolytesBUN/creatinine  normal October 2020       Cologuard in 2020 negative     Assessment and Plan    Diagnoses and all orders for this visit:    1. Anxiety (Primary)  -     atomoxetine (Strattera) 25 MG capsule; Take 1 capsule by mouth Daily.  Dispense: 30 capsule; Refill: 1    2. Essential hypertension  -     Comprehensive Metabolic Panel  -     TSH  -     T4, Free  -     amLODIPine (NORVASC) 5 MG tablet; Take 1 tablet by mouth Daily.  Dispense: 30 tablet; Refill: 3    3. Healthcare maintenance  -     Lipid Panel  -     " Vitamin D 25 Hydroxy    And amlodipine for blood pressure control follow-up with monitor readings 1 month sooner if no improvement    Follow Up   Return in about 1 month (around 2/14/2022), or if symptoms worsen or fail to improve, for Recheck.  Patient was given instructions and counseling regarding her condition or for health maintenance advice. Please see specific information pulled into the AVS if appropriate.

## 2022-02-13 DIAGNOSIS — F41.9 ANXIETY: ICD-10-CM

## 2022-02-13 DIAGNOSIS — I10 ESSENTIAL HYPERTENSION: ICD-10-CM

## 2022-02-14 ENCOUNTER — OFFICE VISIT (OUTPATIENT)
Dept: INTERNAL MEDICINE | Facility: CLINIC | Age: 47
End: 2022-02-14

## 2022-02-14 VITALS
WEIGHT: 176.4 LBS | HEART RATE: 97 BPM | SYSTOLIC BLOOD PRESSURE: 138 MMHG | DIASTOLIC BLOOD PRESSURE: 84 MMHG | HEIGHT: 67 IN | BODY MASS INDEX: 27.69 KG/M2 | OXYGEN SATURATION: 99 %

## 2022-02-14 DIAGNOSIS — F41.9 ANXIETY: ICD-10-CM

## 2022-02-14 DIAGNOSIS — I10 ESSENTIAL HYPERTENSION: Primary | ICD-10-CM

## 2022-02-14 PROCEDURE — 99214 OFFICE O/P EST MOD 30 MIN: CPT | Performed by: FAMILY MEDICINE

## 2022-02-14 RX ORDER — ATENOLOL 25 MG/1
TABLET ORAL
Qty: 90 TABLET | Refills: 1 | Status: SHIPPED | OUTPATIENT
Start: 2022-02-14 | End: 2022-08-09

## 2022-02-14 RX ORDER — AMLODIPINE BESYLATE 5 MG/1
5 TABLET ORAL DAILY
Qty: 90 TABLET | Refills: 1 | Status: SHIPPED | OUTPATIENT
Start: 2022-02-14 | End: 2022-11-21

## 2022-02-14 NOTE — PROGRESS NOTES
"Chief Complaint  follow up to hypertension and follow up to anxiety    Subjective          Alla Hinson presents to [facility]  History of Present Illness  Patient follows up for hypertension anxiety blood pressures under better control with amlodipine and atenolol but she ran out of her atenolol yesterday we discussed the importance of beta-blocker therapy been consistent  She has no unwanted side effects no chest pain shortness of breath or increased fatigue  For her anxiety she has decided not to take the atomoxetine as she is working through her issues and nonpharmacologic ways  Objective   Vital Signs:   /84 (BP Location: Left arm, Patient Position: Sitting, Cuff Size: Adult)   Pulse 97   Ht 170.2 cm (67\")   Wt 80 kg (176 lb 6.4 oz)   SpO2 99%   BMI 27.63 kg/m²     Physical Exam  Vitals and nursing note reviewed.   Constitutional:       Appearance: Normal appearance.   Cardiovascular:      Rate and Rhythm: Normal rate and regular rhythm.      Pulses: Normal pulses.      Heart sounds: Normal heart sounds.   Pulmonary:      Effort: Pulmonary effort is normal.      Breath sounds: Normal breath sounds.   Musculoskeletal:      Right lower leg: No edema.      Left lower leg: No edema.   Neurological:      Mental Status: She is alert.   Psychiatric:         Mood and Affect: Mood normal.         Behavior: Behavior normal.         Thought Content: Thought content normal.         Judgment: Judgment normal.        Result Review :     Common labs    Common Labsle 1/14/22 1/14/22    1012 1012   Glucose 82    BUN 12    Creatinine 0.79    eGFR Non African Am 78    Sodium 141    Potassium 4.2    Chloride 104    Calcium 9.5    Albumin 4.70    Total Bilirubin 0.4    Alkaline Phosphatase 41    AST (SGOT) 25    ALT (SGPT) 33    Total Cholesterol  218 (A)   Triglycerides  91   HDL Cholesterol  66 (A)   LDL Cholesterol   136 (A)   (A) Abnormal value                      Assessment and " Plan    Diagnoses and all orders for this visit:    1. Essential hypertension (Primary)  -     amLODIPine (NORVASC) 5 MG tablet; Take 1 tablet by mouth Daily.  Dispense: 90 tablet; Refill: 1    2. Anxiety    Anxiety continue nonpharmacologic therapies  Hypertension continue amlodipine atenolol  Monitor blood pressure goal less than 140/90  Recommend weight loss calorie restriction diet 1600 ADA  Follow-up otherwise as needed or    Follow Up   Return in about 6 months (around 8/14/2022), or if symptoms worsen or fail to improve, for Recheck.  Patient was given instructions and counseling regarding her condition or for health maintenance advice. Please see specific information pulled into the AVS if appropriate.

## 2022-05-02 ENCOUNTER — OFFICE VISIT (OUTPATIENT)
Dept: INTERNAL MEDICINE | Facility: CLINIC | Age: 47
End: 2022-05-02

## 2022-05-02 VITALS
HEIGHT: 67 IN | SYSTOLIC BLOOD PRESSURE: 184 MMHG | DIASTOLIC BLOOD PRESSURE: 108 MMHG | BODY MASS INDEX: 26.57 KG/M2 | HEART RATE: 88 BPM | WEIGHT: 169.3 LBS | OXYGEN SATURATION: 98 %

## 2022-05-02 DIAGNOSIS — I10 ESSENTIAL HYPERTENSION: Primary | ICD-10-CM

## 2022-05-02 DIAGNOSIS — F41.9 ANXIETY: ICD-10-CM

## 2022-05-02 PROCEDURE — 99214 OFFICE O/P EST MOD 30 MIN: CPT | Performed by: FAMILY MEDICINE

## 2022-05-02 RX ORDER — VORTIOXETINE 10 MG/1
10 TABLET, FILM COATED ORAL DAILY
Qty: 30 TABLET | Refills: 1 | Status: SHIPPED | OUTPATIENT
Start: 2022-05-16 | End: 2022-05-05

## 2022-05-02 NOTE — PROGRESS NOTES
"Chief Complaint  Anxiety  Hypertension  Subjective          Alla Hinson presents to [facility]  History of Present Illness  Patient follows up for ongoing treatment of anxiety she feels that she has become more overwhelmed difficulty at finishing tasks she has had history of anxiety and medications tried in the past including Zoloft venlafaxine Celexa have not provided much benefit.  She has adequate sleep  Chronic blood pressure with some fluctuating readings blood pressure slightly elevated does check it at home usually less than 140/90  Objective   Vital Signs:   BP (!) 184/108 (BP Location: Right arm, Patient Position: Sitting, Cuff Size: Adult)   Pulse 88   Ht 170.2 cm (67\")   Wt 76.8 kg (169 lb 4.8 oz)   SpO2 98%   BMI 26.52 kg/m²     Physical Exam  Vitals and nursing note reviewed.   Constitutional:       Appearance: Normal appearance.   HENT:      Head: Normocephalic and atraumatic.   Eyes:      General: No scleral icterus.  Cardiovascular:      Rate and Rhythm: Normal rate and regular rhythm.      Pulses: Normal pulses.      Heart sounds: Normal heart sounds.   Pulmonary:      Effort: Pulmonary effort is normal.      Breath sounds: Normal breath sounds.   Musculoskeletal:      Right lower leg: No edema.      Left lower leg: No edema.   Skin:     General: Skin is warm and dry.   Neurological:      Mental Status: She is alert.   Psychiatric:         Mood and Affect: Mood normal.         Behavior: Behavior normal.         Thought Content: Thought content normal.         Judgment: Judgment normal.        Result Review :     Common labs    Common Labsle 1/14/22 1/14/22    1012 1012   Glucose 82    BUN 12    Creatinine 0.79    eGFR Non African Am 78    Sodium 141    Potassium 4.2    Chloride 104    Calcium 9.5    Albumin 4.70    Total Bilirubin 0.4    Alkaline Phosphatase 41    AST (SGOT) 25    ALT (SGPT) 33    Total Cholesterol  218 (A)   Triglycerides  91   HDL " Cholesterol  66 (A)   LDL Cholesterol   136 (A)   (A) Abnormal value                      Assessment and Plan    Diagnoses and all orders for this visit:    1. Essential hypertension (Primary)    2. Anxiety  -     Ambulatory Referral to Psychology    Other orders  -     Vortioxetine HBr (Trintellix) 10 MG tablet; Take 10 mg by mouth Daily.  Dispense: 30 tablet; Refill: 1        Monitor blood pressure goals less than 140/90   She will call and 2 weeks benefit of initiating the Trintellix 5 mg samples were giving she will then increase to 10 mg and call with report of blood pressure is well    Follow Up   Return in about 1 month (around 6/2/2022), or if symptoms worsen or fail to improve, for Recheck.  Patient was given instructions and counseling regarding her condition or for health maintenance advice. Please see specific information pulled into the AVS if appropriate.

## 2022-05-05 RX ORDER — PAROXETINE 10 MG/1
10 TABLET, FILM COATED ORAL EVERY MORNING
Qty: 30 TABLET | Refills: 3 | Status: SHIPPED | OUTPATIENT
Start: 2022-05-05 | End: 2022-06-21

## 2022-06-21 ENCOUNTER — OFFICE VISIT (OUTPATIENT)
Dept: BEHAVIORAL HEALTH | Facility: CLINIC | Age: 47
End: 2022-06-21

## 2022-06-21 VITALS
OXYGEN SATURATION: 98 % | DIASTOLIC BLOOD PRESSURE: 80 MMHG | SYSTOLIC BLOOD PRESSURE: 128 MMHG | WEIGHT: 172 LBS | BODY MASS INDEX: 26.94 KG/M2 | RESPIRATION RATE: 18 BRPM | HEART RATE: 75 BPM

## 2022-06-21 DIAGNOSIS — F41.8 DEPRESSION WITH ANXIETY: Primary | ICD-10-CM

## 2022-06-21 PROCEDURE — 90792 PSYCH DIAG EVAL W/MED SRVCS: CPT

## 2022-06-21 RX ORDER — PAROXETINE HYDROCHLORIDE 20 MG/1
20 TABLET, FILM COATED ORAL EVERY MORNING
Qty: 30 TABLET | Refills: 1 | Status: SHIPPED | OUTPATIENT
Start: 2022-06-21 | End: 2022-07-18

## 2022-06-21 NOTE — PROGRESS NOTES
Subjective   Patient ID: Alla Hinson is a 46 y.o. female is being seen for consultation today at the request of No ref. provider found.  (Michael PCP)    Depression  Patient complains of depression. She complains of depressed mood, difficulty concentrating, fatigue and impaired memory. Onset was approximately several years ago. Symptoms have been gradually worsening since that time. Current symptoms include: depressed mood, difficulty concentrating, fatigue, impaired memory and psychomotor agitation. Patient denies anhedonia, hypersomnia, suicidal attempt, weight gain and weight loss. Family history significant for anxiety. Possible organic causes contributing are: none. Risk factors: negative life event covid 2020 time period. Previous treatment includes medication. She complains of the following side effects from the treatment: none.    Patient currently on Paxil 10 mg a day reports taking the medication for around 6 weeks, at first patient denies major benefit but then as interview went on believes it is taking the edge off in terms of anxiety.  S/S of anxiety occur nearly every day or often tied to her performance with her as a mother or as an employee at her job, mild physical symptoms are reported, patient reports a panic attack like episode that happened in May where she first sought out medication from her PCP.  Patient believed today's visit would be counseling, patient was educated on my role in psychiatric medication and psychiatric medication management.  Was treated previously with Effexor at a semihigh dose attempted to wean herself off but, mild to moderate S/S of withdrawal from medication reported.    Patient educated on the risk/benefits of medication and what target symptoms we are trying to treat, ADHD like symptoms of depression occur in tandem, patient was agreeable to try increasing Paxil to 20 mg a day and waiting several weeks to reassess for benefit, patient has 10 mg tablets at home  told to double dose and a higher prescription was sent in, patient denies SI/HI or history of attempt.  No history of hospitalization for psychiatric reasons reported, patient denies using nicotine street drugs or pot, drinks wine on occasion, is a former smoker, moderate amount of caffeine reported.    Patient describes sleeping as pretty good, is a night owl at times, gets around 6 hours a night but does not feel refreshed in the morning is tired the next day, patient works full-time in environmental technology, has been  for 20 years and has 2 teenage daughters.  No S/S of bipolar, psychosis, eating disorder, substance abuse, or personality disorder reported or in evidence during interview.        Prior psychiatric medications:  Effexor, was on for several years, benefit reported in the beginning  Zoloft, dose/duration unknown  Lexapro, dose/duration unknown    History of trauma/abuse denied, history of physical or sexual abuse denied.    The following portions of the patient's history were reviewed and updated as appropriate:   She  has a past medical history of Anxiety, Depression, Depression with anxiety (6/21/2022), and Ovarian cyst.  She does not have any pertinent problems on file.  She  has a past surgical history that includes Appendectomy (11/10/2014) and Eye surgery (2018?).  Her family history includes Hypertension in her father; No Known Problems in her mother.  She  reports that she has quit smoking. Her smoking use included cigarettes. She has a 1.50 pack-year smoking history. She has never used smokeless tobacco. She reports current alcohol use of about 7.0 standard drinks of alcohol per week. She reports that she does not use drugs.  Current Outpatient Medications   Medication Sig Dispense Refill   • ALLERGY SERUM INJECTION Inject  under the skin into the appropriate area as directed 1 (One) Time.     • amLODIPine (NORVASC) 5 MG tablet Take 1 tablet by mouth Daily. 90 tablet 1   • atenolol  (TENORMIN) 25 MG tablet TAKE ONE TABLET BY MOUTH DAILY 90 tablet 1   • diphenhydrAMINE (BENADRYL) 25 mg capsule Take 1 tablet by mouth Every Night.     • ibuprofen (ADVIL,MOTRIN) 200 MG tablet Take 400 mg by mouth daily as needed for mild pain (1-3).     • levonorgestrel (MIRENA) 20 MCG/24HR IUD 1 each by Intrauterine route 1 (one) time.     • PARoxetine (Paxil) 20 MG tablet Take 1 tablet by mouth Every Morning for 60 days. 30 tablet 1   • fluticasone (FLONASE) 50 MCG/ACT nasal spray 1 spray into the nostril(s) as directed by provider Daily.       No current facility-administered medications for this visit.     She has No Known Allergies..    Review of Systems   Constitutional: Negative.    Respiratory: Negative.    Cardiovascular: Negative.    Gastrointestinal: Negative.    Genitourinary: Negative.    Psychiatric/Behavioral: Positive for decreased concentration. The patient is nervous/anxious.        Objective   Mental Status Exam  Appearance:  clean and casually dressed, appropriate  Attitude toward clinician:  cooperative and agreeable   Speech:    Rate:  regular rate and rhythm   Volume:  normal  Motor:  agitation  Mood:  anxious  Affect:  mood congruent  Thought Processes:  linear, logical, and goal directed  Thought Content:  normal  Suicidal Thoughts:  absent  Homicidal Thoughts:  absent  Perceptual Disturbance: no perceptual disturbance  Attention and Concentration:  fair  Insight and Judgement:  fair  Memory:  deficit in immediate  Physical Exam  Constitutional:       Appearance: Normal appearance.   Cardiovascular:      Rate and Rhythm: Normal rate.      Comments: Denies chest pain  Pulmonary:      Effort: Pulmonary effort is normal.      Comments: Denies shortness of breath  Musculoskeletal:      Comments: Denies recent falls   Neurological:      Mental Status: She is alert and oriented to person, place, and time.   Psychiatric:         Behavior: Behavior normal.         Thought Content: Thought content  normal.         Judgment: Judgment normal.       Lab Review:   Office Visit on 01/14/2022   Component Date Value   • Glucose 01/14/2022 82    • BUN 01/14/2022 12    • Creatinine 01/14/2022 0.79    • Sodium 01/14/2022 141    • Potassium 01/14/2022 4.2    • Chloride 01/14/2022 104    • CO2 01/14/2022 26.5    • Calcium 01/14/2022 9.5    • Total Protein 01/14/2022 7.0    • Albumin 01/14/2022 4.70    • ALT (SGPT) 01/14/2022 33    • AST (SGOT) 01/14/2022 25    • Alkaline Phosphatase 01/14/2022 41    • Total Bilirubin 01/14/2022 0.4    • eGFR Non  Amer 01/14/2022 78    • Globulin 01/14/2022 2.3    • A/G Ratio 01/14/2022 2.0    • BUN/Creatinine Ratio 01/14/2022 15.2    • Anion Gap 01/14/2022 10.5    • TSH 01/14/2022 1.770    • Free T4 01/14/2022 1.22    • Total Cholesterol 01/14/2022 218 (A)   • Triglycerides 01/14/2022 91    • HDL Cholesterol 01/14/2022 66 (A)   • LDL Cholesterol  01/14/2022 136 (A)   • VLDL Cholesterol 01/14/2022 16    • LDL/HDL Ratio 01/14/2022 2.03    • 25 Hydroxy, Vitamin D 01/14/2022 30.0      Assessment & Plan   Diagnoses and all orders for this visit:    1. Depression with anxiety (Primary)  Assessment & Plan:  Patient's depression is recurrent and is moderate without psychosis. Their depression is currently active and the condition is newly identified. This will be reassessed in 4 weeks. F/U as described:patient was prescribed an antidepressant medicine.    • Medication: OK to paxil to 20 mg/day r/t anxiety/depression  • Refills: Paxil sent to [facility]  • Consider adding Wellbutrin further along for ADHD-like symptoms of depression but only after anxiety is more under control.  • Follow up:  4 weeks, or sooner if needed, Monday 7/18/22 11:00 AM  • Monitor for side effects/improvement report to [facility] immediately  • [email or web address]     • Education: Please read/review attached documents, reach out with questions/concerns  • Referral: [facility] or [place] [facility] look up  online      Increase positive coping skills as tolerated (light/moderate exercise, hobbies, art, music, mediation, yoga, journal, friends/family/pets, etc) to aid in overall health and help reduce stress.      Other orders  -     PARoxetine (Paxil) 20 MG tablet; Take 1 tablet by mouth Every Morning for 60 days.  Dispense: 30 tablet; Refill: 1    Return in 4 weeks (on 7/19/2022) for Recheck, Medication Check.    GOALS:    Short Term Goals: Patient will be compliant with medication, and patient will have no significant medication related side effects.  Patient will be engaged in psychotherapy as indicated.  Patient will report subjective improvement of symptoms.    Long term goals: To stabilize mood and treat/improve subjective symptoms, the patient will stay out of the hospital, the patient will be at an optimal level of functioning, and the patient will take all medications as prescribed.    TREATMENT PLAN: Continue supportive psychotherapy efforts and medications as indicated.  Medication and treatment options, both pharmacological and non-pharmacological treatment options, discussed during today's visit, including any off label use of medication. Patient acknowledged and verbally consented with current treatment plan and was educated on the importance of compliance with treatment and follow-up appointments.      MEDICATION ISSUES:  Discussed treatment plan and medication options of prescribed medication as well as the risks, benefits, any black box warnings, and side effects including potential falls, possible impaired driving, and metabolic adversities among others, including any off label use of medication. Patient is agreeable to call the office with any worsening of symptoms or onset of side effects, or if any concerns or questions arise.  The contact information for the office is made available to the patient. Patient is agreeable to call 911 or go to the nearest ER should they begin having any SI/HI, or if  any urgent concerns arise. No medication side effects or related complaints today.     MEDS ORDERED DURING VISIT:  Orders Placed This Encounter      PARoxetine (Paxil) 20 MG tablet          Sig: Take 1 tablet by mouth Every Morning for 60 days.          Dispense:  30 tablet          Refill:  1      Return in 4 weeks (on 7/19/2022) for Recheck, Medication Check.    Progress toward goal: Not at goal    Functional Status: Mild impairment     Prognosis: Good with Ongoing Treatment

## 2022-06-21 NOTE — PATIENT INSTRUCTIONS
Medication: OK to paxil to 20 mg/day r/t anxiety/depression  Refills: Paxil sent to [facility]  Consider adding Wellbutrin further along for ADHD-like symptoms of depression but only after anxiety is more under control.  Follow up:  4 weeks, or sooner if needed, Monday 7/18/22 11:00 AM  Monitor for side effects/improvement report to [facility] immediately  [email or web address]     Education: Please read/review attached documents, reach out with questions/concerns  Referral: [facility] or [place] [facility] look up online      Increase positive coping skills as tolerated (light/moderate exercise, hobbies, art, music, mediation, yoga, journal, friends/family/pets, etc) to aid in overall health and help reduce stress.    [address] [place] [address]  P: [phone]  F: [phone]

## 2022-06-21 NOTE — ASSESSMENT & PLAN NOTE
Patient's depression is recurrent and is moderate without psychosis. Their depression is currently active and the condition is newly identified. This will be reassessed in 4 weeks. F/U as described:patient was prescribed an antidepressant medicine.    • Medication: OK to paxil to 20 mg/day r/t anxiety/depression  • Refills: Paxil sent to [facility]  • Consider adding Wellbutrin further along for ADHD-like symptoms of depression but only after anxiety is more under control.  • Follow up:  4 weeks, or sooner if needed, Monday 7/18/22 11:00 AM  • Monitor for side effects/improvement report to [facility] immediately  • [email or web address]     • Education: Please read/review attached documents, reach out with questions/concerns  • Referral: [facility] or [place] [facility] look up online      Increase positive coping skills as tolerated (light/moderate exercise, hobbies, art, music, mediation, yoga, journal, friends/family/pets, etc) to aid in overall health and help reduce stress.

## 2022-07-18 ENCOUNTER — TELEMEDICINE (OUTPATIENT)
Dept: BEHAVIORAL HEALTH | Facility: CLINIC | Age: 47
End: 2022-07-18

## 2022-07-18 DIAGNOSIS — F41.8 DEPRESSION WITH ANXIETY: Primary | ICD-10-CM

## 2022-07-18 PROCEDURE — 99213 OFFICE O/P EST LOW 20 MIN: CPT

## 2022-07-18 RX ORDER — BUPROPION HYDROCHLORIDE 150 MG/1
150 TABLET ORAL DAILY
Qty: 30 TABLET | Refills: 2 | Status: SHIPPED | OUTPATIENT
Start: 2022-07-18 | End: 2022-08-19
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
## 2022-07-18 NOTE — ASSESSMENT & PLAN NOTE
Patient's depression is recurrent and is moderate without psychosis. Their depression is currently active and the condition is unchanged. This will be reassessed in 4 weeks. F/U as described:patient was prescribed an antidepressant medicine.    Plan of Care:  1. Medication changes today: YES, Cut paroxetine/Paxil dose in half for 3 to 4 days then stop. Okay to start Wellbutrin/bupropion  mg a day in the morning with small amount of food  2. Follow up in 4 weeks.   3. Pt to follow up on her own for 1:1 counseling  4. Consider Fenergo testing in future (psychotropic profile on website)

## 2022-07-18 NOTE — PATIENT INSTRUCTIONS
Plan of Care:  Medication changes today: YES, Cut paroxetine/Paxil dose in half for 3 to 4 days then stop. Okay to start Wellbutrin/bupropion  mg a day in the morning with small amount of food  Follow up in 4 weeks.   Pt to follow up on her own for 1:1 counseling  Consider genesight testing in future (psychotropic profile on website)  When I ordered the Wellbutrin there was an alert via your insurance, meaning a prior authorization might be required so it might take 2 to 3 days to get it approved /fill, once approved your pharmacy will reach out to you when ready    General Instructions:  Patient to monitor for side effects, worsening symptoms, and/or improvement, report to PMHNP Ranjeet greenwood.  If a sooner appointment is needed please call office at number listed below to schedule.  Please request refills through your pharmacy prior to reaching out to office.  Please give office staff (1) week to schedule a referral, if you have not heard anything around that time call office and ask to speak to outgoing referral .    Rolando Menendez   Psychiatric Mental Health Nurse Practitioner (PMHNP)  [address] [place] [address]  P: [phone]  F: [phone]

## 2022-08-08 DIAGNOSIS — F41.9 ANXIETY: ICD-10-CM

## 2022-08-08 DIAGNOSIS — I10 ESSENTIAL HYPERTENSION: ICD-10-CM

## 2022-08-09 RX ORDER — ATENOLOL 25 MG/1
TABLET ORAL
Qty: 90 TABLET | Refills: 1 | Status: SHIPPED | OUTPATIENT
Start: 2022-08-09 | End: 2023-01-24

## 2022-08-19 ENCOUNTER — TELEMEDICINE (OUTPATIENT)
Dept: BEHAVIORAL HEALTH | Facility: CLINIC | Age: 47
End: 2022-08-19

## 2022-08-19 DIAGNOSIS — F41.8 DEPRESSION WITH ANXIETY: Primary | ICD-10-CM

## 2022-08-19 PROCEDURE — 99213 OFFICE O/P EST LOW 20 MIN: CPT

## 2022-08-19 RX ORDER — BUPROPION HYDROCHLORIDE 300 MG/1
300 TABLET ORAL DAILY
Qty: 30 TABLET | Refills: 0 | Status: SHIPPED | OUTPATIENT
Start: 2022-08-19 | End: 2022-09-16

## 2022-08-19 NOTE — ASSESSMENT & PLAN NOTE
Patient's depression is recurrent and is moderate without psychosis. Their depression is currently active and the condition is improving with treatment. This will be reassessed in 4 weeks. F/U as described:patient will continue current medication therapy and patient was prescribed an antidepressant medicine.
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
## 2022-08-19 NOTE — PATIENT INSTRUCTIONS
Plan of Care:  Ok to increase Wellbutrin XL to 300 mg/day and re-assess in 4 weeks.    General Instructions:  Patient to monitor for side effects, worsening symptoms, and/or improvement, report to PMHNP Ranjeet greenwood.  If a sooner appointment is needed please call office at number listed below to schedule.  Please request refills through your pharmacy prior to reaching out to office.  Please give office staff (1) week to schedule a referral, if you have not heard anything around that time call office and ask to speak to outgoing referral .    Rolando Menendez   Psychiatric Mental Health Nurse Practitioner (PMHNP)  [address] [place] [address]  P: [phone]  F: [phone]

## 2022-09-16 ENCOUNTER — TELEMEDICINE (OUTPATIENT)
Dept: BEHAVIORAL HEALTH | Facility: CLINIC | Age: 47
End: 2022-09-16

## 2022-09-16 DIAGNOSIS — F41.8 DEPRESSION WITH ANXIETY: Primary | ICD-10-CM

## 2022-09-16 PROCEDURE — 99214 OFFICE O/P EST MOD 30 MIN: CPT

## 2022-09-16 RX ORDER — BUPROPION HYDROCHLORIDE 150 MG/1
150 TABLET ORAL DAILY
Qty: 30 TABLET | Refills: 2 | Status: SHIPPED | OUTPATIENT
Start: 2022-09-16 | End: 2023-02-03 | Stop reason: SDUPTHER

## 2022-09-16 NOTE — PROGRESS NOTES
Subjective   Patient ID: Alla Hinson is a 46 y.o. female is here today for follow-up..     Anxiety  Presents for follow-up visit. Symptoms include excessive worry and nausea. Patient reports no chest pain, insomnia, panic or shortness of breath. Symptoms occur most days. The severity of symptoms is moderate. The quality of sleep is good.     Her past medical history is significant for depression. Compliance with medications is %. Side effects of treatment include GI discomfort (Mild nausea).   Depression  Visit Type: follow-up  Patient presents with the following symptoms: excessive worry and nausea.  Patient is not experiencing: insomnia, panic and shortness of breath.  Frequency of symptoms: occasionally   Severity: moderate   Sleep quality: good  Compliance with medications:  %  Treatment side effects: Mild nausea.    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     The following portions of the patient's history were reviewed and updated as appropriate:   She  has a past medical history of Anxiety, Depression, Depression with anxiety (06/21/2022), and Ovarian cyst.  She does not have any pertinent problems on file.  She  has a past surgical history that includes Appendectomy (11/10/2014) and Eye surgery (2018?).  Her family history includes Hypertension in her father; No Known Problems in her mother.  She  reports that she has quit smoking. Her smoking use included cigarettes. She has a 1.50 pack-year smoking history. She has never used smokeless tobacco. She reports current alcohol use of about 7.0 standard drinks of alcohol per week. She reports that she does not use drugs.  Current Outpatient Medications   Medication Sig Dispense Refill   •  ALLERGY SERUM INJECTION Inject  under the skin into the appropriate area as directed 1 (One) Time.     • amLODIPine (NORVASC) 5 MG tablet Take 1 tablet by mouth Daily. 90 tablet 1   • atenolol (TENORMIN) 25 MG tablet TAKE ONE TABLET BY MOUTH DAILY 90 tablet 1   • buPROPion XL (WELLBUTRIN XL) 150 MG 24 hr tablet Take 1 tablet by mouth Daily for 90 days. 30 tablet 2   • diphenhydrAMINE (BENADRYL) 25 mg capsule Take 1 tablet by mouth Every Night.     • fluticasone (FLONASE) 50 MCG/ACT nasal spray 1 spray into the nostril(s) as directed by provider Daily. BACK ON FOR 1 WEEK FOR ALLERGIES     • ibuprofen (ADVIL,MOTRIN) 200 MG tablet Take 400 mg by mouth daily as needed for mild pain (1-3).     • levonorgestrel (MIRENA) 20 MCG/24HR IUD 1 each by Intrauterine route 1 (one) time.       No current facility-administered medications for this visit.     Current Outpatient Medications on File Prior to Visit   Medication Sig   • ALLERGY SERUM INJECTION Inject  under the skin into the appropriate area as directed 1 (One) Time.   • amLODIPine (NORVASC) 5 MG tablet Take 1 tablet by mouth Daily.   • atenolol (TENORMIN) 25 MG tablet TAKE ONE TABLET BY MOUTH DAILY   • diphenhydrAMINE (BENADRYL) 25 mg capsule Take 1 tablet by mouth Every Night.   • fluticasone (FLONASE) 50 MCG/ACT nasal spray 1 spray into the nostril(s) as directed by provider Daily. BACK ON FOR 1 WEEK FOR ALLERGIES   • ibuprofen (ADVIL,MOTRIN) 200 MG tablet Take 400 mg by mouth daily as needed for mild pain (1-3).   • levonorgestrel (MIRENA) 20 MCG/24HR IUD 1 each by Intrauterine route 1 (one) time.   • [DISCONTINUED] buPROPion XL (WELLBUTRIN XL) 300 MG 24 hr tablet Take 1 tablet by mouth Daily for 30 days.     No current facility-administered medications on file prior to visit.     She has No Known Allergies..    Review of Systems   Constitutional: Positive for diaphoresis.        Flushing and sweatiness reported soon after taking a.m. meds Wellbutrin and atenolol and  allergy pill   Respiratory: Negative for chest tightness and shortness of breath.    Cardiovascular: Negative for chest pain.   Gastrointestinal: Positive for nausea. Negative for vomiting.   Musculoskeletal: Negative for gait problem.   Neurological: Negative.    Psychiatric/Behavioral: Negative.  The patient does not have insomnia.        Objective   Mental Status Exam  Appearance:  clean and casually dressed, appropriate  Attitude toward clinician:  cooperative and agreeable   Speech:    Rate:  regular rate and rhythm   Volume:  normal  Motor:  no abnormal movements present  Mood:  appropriate  Affect:  euthymic  Thought Processes:  linear, logical, and goal directed  Thought Content:  normal  Suicidal Thoughts:  absent  Homicidal Thoughts:  absent  Perceptual Disturbance: no perceptual disturbance  Attention and Concentration:  good  Insight and Judgement:  good  Memory:  memory appears to be intact  Physical Exam  Constitutional:       Appearance: Normal appearance.   HENT:      Head: Normocephalic.      Nose: Nose normal.   Pulmonary:      Effort: Pulmonary effort is normal.   Neurological:      General: No focal deficit present.      Mental Status: She is alert and oriented to person, place, and time.   Psychiatric:         Mood and Affect: Mood normal.         Behavior: Behavior normal.         Thought Content: Thought content normal.         Judgment: Judgment normal.       Lab Review:   No visits with results within 2 Month(s) from this visit.   Latest known visit with results is:   Office Visit on 01/14/2022   Component Date Value   • Glucose 01/14/2022 82    • BUN 01/14/2022 12    • Creatinine 01/14/2022 0.79    • Sodium 01/14/2022 141    • Potassium 01/14/2022 4.2    • Chloride 01/14/2022 104    • CO2 01/14/2022 26.5    • Calcium 01/14/2022 9.5    • Total Protein 01/14/2022 7.0    • Albumin 01/14/2022 4.70    • ALT (SGPT) 01/14/2022 33    • AST (SGOT) 01/14/2022 25    • Alkaline Phosphatase 01/14/2022  41    • Total Bilirubin 01/14/2022 0.4    • eGFR Non  Amer 01/14/2022 78    • Globulin 01/14/2022 2.3    • A/G Ratio 01/14/2022 2.0    • BUN/Creatinine Ratio 01/14/2022 15.2    • Anion Gap 01/14/2022 10.5    • TSH 01/14/2022 1.770    • Free T4 01/14/2022 1.22    • Total Cholesterol 01/14/2022 218 (A)   • Triglycerides 01/14/2022 91    • HDL Cholesterol 01/14/2022 66 (A)   • LDL Cholesterol  01/14/2022 136 (A)   • VLDL Cholesterol 01/14/2022 16    • LDL/HDL Ratio 01/14/2022 2.03    • 25 Hydroxy, Vitamin D 01/14/2022 30.0      Assessment & Plan   Diagnoses and all orders for this visit:    1. Depression with anxiety (Primary)  Assessment & Plan:  Patient's depression is recurrent and is moderate without psychosis. Their depression is currently in partial remission and the condition is improving with treatment. This will be reassessed 2 months. F/U as described:patient was prescribed an antidepressant medicine and wellbutrin dose decreased.      Other orders  -     buPROPion XL (WELLBUTRIN XL) 150 MG 24 hr tablet; Take 1 tablet by mouth Daily for 90 days.  Dispense: 30 tablet; Refill: 2    Return in 2 months (on 11/16/2022) for Recheck, Video visit.          A thorough discussion was had that included review of disease process, need for continued monitoring and additional treatment options including use of pharmacological and non-pharmacological approaches to care, decisions were made and agreed upon by patient and provider. Discussed the risks, benefits, and potential side effects of the medications; patient ackowledged and verbally consented. Patient is advised to avoid driving or operating heavy machinery if they feel drowsy or over sedated. Patient is agreeable to call the office with any worsening of symptoms or onset of intolerable side effects. Patient is agreeable to call 911 or go to the nearest ER should he/she begin having SI/HI.     Barriers:    [x] Co-Occurring Conditions [] Medically Complex []  Language/need for   [] Financial    [] Transportation Issues   [] Low Support  [] Poor compliance with medications/appts   Strengths:   [x] Motivated    [] Supportive friends/family   [] Knowledge of disease  [] Shyann/Bahai    [x] Overall good health  [] Pets    Short-Term Goals: Patient will be compliant with medication management and note improvement in symptoms over the next 4 to 6 weeks or at next scheduled visit.    Long-Term Goals: Patient will continue psychotherapy as well as medication regimen without impairment in daily functioning over the next 6 months.      Progress towards goals:     [] Minor [] Moderate [] Little to None [x] States improvement [] Newly Identified   Impairment:      [x] Minor [] Moderate [] Significant  [] Severe     Prognosis:  GOOD with ongoing treatment    Follow Up   -Patient instructed to keep follow up appointments and notify office if cancellation/reschedule is needed.  -Patient was given instructions and counseling regarding condition being managed and health maintenance advice. Please see specific information pulled into the AVS if appropriate.     Errors in dictation may reflect use of voice recognition software and not all errors in transcription may have been detected prior to signing. Author states that some information has been carried over from previous notes/encounters, information has been reviewed and updated.    Patient assessed today via MyChart through [facility] pt is at home in a secure environment and verbalizes privacy during interview. JCARLOS Pollack is at home in a secure environment using a secure laptop. The patient's condition being diagnosed/treated is appropriate for telemedicine. The provider identified himself as well as his credentials.   The patient, and/or patient's guardian, consent to be seen remotely, and when consent is given they understand that the consent allows for patient identifiable information to be sent to a third party as needed.    They may refuse to be seen remotely at any time. The electronic data is encrypted and password protected, and the patient and/or guardian has been advised of the potential risks to privacy not withstanding such measures.

## 2022-09-16 NOTE — ASSESSMENT & PLAN NOTE
Patient's depression is recurrent and is moderate without psychosis. Their depression is currently in partial remission and the condition is improving with treatment. This will be reassessed 2 months. F/U as described:patient was prescribed an antidepressant medicine and wellbutrin dose decreased.

## 2022-09-16 NOTE — PATIENT INSTRUCTIONS
Plan of Care:  Decrease Wellbutrin back to previous dose of  mg a day  Follow-up in 2 months or sooner if needed    General Instructions:  Patient to monitor for side effects, worsening symptoms, and/or improvement, report to PMHNP Ranjeet greenwood.  If a sooner appointment is needed please call office at number listed below to schedule.  Please request refills through your pharmacy prior to reaching out to office.  Please give office staff (1) week to schedule a referral, if you have not heard anything around that time call office and ask to speak to outgoing referral .    Rolando Menendez   Psychiatric Mental Health Nurse Practitioner (PMHNP)  [address] [place] [address]  P: [phone]  F: [phone]
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
## 2022-11-04 ENCOUNTER — TELEMEDICINE (OUTPATIENT)
Dept: BEHAVIORAL HEALTH | Facility: CLINIC | Age: 47
End: 2022-11-04

## 2022-11-04 DIAGNOSIS — F41.1 GENERALIZED ANXIETY DISORDER WITH PANIC ATTACKS: ICD-10-CM

## 2022-11-04 DIAGNOSIS — F33.1 MDD (MAJOR DEPRESSIVE DISORDER), RECURRENT EPISODE, MODERATE: Primary | ICD-10-CM

## 2022-11-04 DIAGNOSIS — F41.0 GENERALIZED ANXIETY DISORDER WITH PANIC ATTACKS: ICD-10-CM

## 2022-11-04 PROCEDURE — 99214 OFFICE O/P EST MOD 30 MIN: CPT

## 2022-11-04 RX ORDER — BUSPIRONE HYDROCHLORIDE 5 MG/1
5 TABLET ORAL 2 TIMES DAILY PRN
Qty: 60 TABLET | Refills: 2 | Status: SHIPPED | OUTPATIENT
Start: 2022-11-04 | End: 2023-02-03

## 2022-11-04 NOTE — PATIENT INSTRUCTIONS
Plan of Care:  Depression, anxiety, overall improving/stable, sleep has improved, poor concentration stable/minor  OK to start BuSpar/buspirone 5 mg, take 1 up to twice a day as needed for acute anxiety/panic, first time taking medication be at home in a safe environment do not drive immediately after until sedation can be assessed.  OK to continue Wellbutrin  mg as previously ordered  Counseling encouraged  Follow Up with Ranjeet in 3 months, appointment scheduled  Please read/review attached documents    General Instructions:  Patient to monitor for side effects, worsening symptoms, and/or improvement, report to PMHNP Ranjeet immediatlejamison.  If a sooner appointment is needed please call office at number listed below to schedule.  Please request refills through your pharmacy prior to reaching out to office or through [facility]  Please give office staff (1) week to schedule a referral, if you have not heard anything around that time call office and ask to speak to outgoing referral .    Rolando Menendez   Psychiatric Mental Health Nurse Practitioner (PMHNP)  [address] [place] [address]  P: [phone]  F: [phone]

## 2022-11-20 DIAGNOSIS — I10 ESSENTIAL HYPERTENSION: ICD-10-CM

## 2022-11-21 RX ORDER — AMLODIPINE BESYLATE 5 MG/1
TABLET ORAL
Qty: 30 TABLET | Refills: 0 | Status: SHIPPED | OUTPATIENT
Start: 2022-11-21 | End: 2022-12-15

## 2022-12-14 DIAGNOSIS — I10 ESSENTIAL HYPERTENSION: ICD-10-CM

## 2022-12-15 RX ORDER — AMLODIPINE BESYLATE 5 MG/1
TABLET ORAL
Qty: 30 TABLET | Refills: 0 | Status: SHIPPED | OUTPATIENT
Start: 2022-12-15 | End: 2023-01-24

## 2023-01-23 DIAGNOSIS — I10 ESSENTIAL HYPERTENSION: ICD-10-CM

## 2023-01-23 DIAGNOSIS — F41.9 ANXIETY: ICD-10-CM

## 2023-01-24 RX ORDER — AMLODIPINE BESYLATE 5 MG/1
TABLET ORAL
Qty: 30 TABLET | Refills: 0 | Status: SHIPPED | OUTPATIENT
Start: 2023-01-24 | End: 2023-02-17

## 2023-01-24 RX ORDER — ATENOLOL 25 MG/1
TABLET ORAL
Qty: 90 TABLET | Refills: 0 | Status: SHIPPED | OUTPATIENT
Start: 2023-01-24

## 2023-02-03 ENCOUNTER — TELEPHONE (OUTPATIENT)
Dept: INTERNAL MEDICINE | Facility: CLINIC | Age: 48
End: 2023-02-03

## 2023-02-03 ENCOUNTER — TELEMEDICINE (OUTPATIENT)
Dept: BEHAVIORAL HEALTH | Facility: CLINIC | Age: 48
End: 2023-02-03
Payer: COMMERCIAL

## 2023-02-03 DIAGNOSIS — F41.0 GENERALIZED ANXIETY DISORDER WITH PANIC ATTACKS: Chronic | ICD-10-CM

## 2023-02-03 DIAGNOSIS — F33.1 MDD (MAJOR DEPRESSIVE DISORDER), RECURRENT EPISODE, MODERATE: Primary | Chronic | ICD-10-CM

## 2023-02-03 DIAGNOSIS — F41.1 GENERALIZED ANXIETY DISORDER WITH PANIC ATTACKS: Chronic | ICD-10-CM

## 2023-02-03 PROCEDURE — 99214 OFFICE O/P EST MOD 30 MIN: CPT

## 2023-02-03 PROCEDURE — 90833 PSYTX W PT W E/M 30 MIN: CPT

## 2023-02-03 RX ORDER — BUPROPION HYDROCHLORIDE 150 MG/1
150 TABLET ORAL DAILY
Qty: 90 TABLET | Refills: 1 | Status: SHIPPED | OUTPATIENT
Start: 2023-02-03

## 2023-02-03 RX ORDER — HYDROXYZINE PAMOATE 25 MG/1
CAPSULE ORAL
Qty: 14 CAPSULE | Refills: 0 | Status: SHIPPED | OUTPATIENT
Start: 2023-02-03

## 2023-02-03 RX ORDER — HYDROXYZINE PAMOATE 25 MG/1
CAPSULE ORAL
Qty: 14 CAPSULE | Refills: 0 | Status: SHIPPED | OUTPATIENT
Start: 2023-02-03 | End: 2023-02-03

## 2023-02-03 NOTE — PATIENT INSTRUCTIONS
PLAN OF CARE:   Patient was seen for depression and anxiety, moderateintensity. PHQ/KEI scores: 7/6  Above listed condition/conditions are improving with treatment.  3.    Current psychiatric medications: Wellbutrin xl 150 mg/day. Medication Changes: Ok to try vistaril/hydroxyzine 25 mg 1 or 2 prn for panic, D/C Buspar, pt tried one time and did not like how it made her feel. Consider propranolol/inderal if medical MD ok with it, pt already on another beta blocker  4.    Follow Up with Ranjeet: Return in about 3 months  5.    Reach out to Ranjeet in 1 week for an update on vistaril, if not effective/tolerated, low dose short term xanax can be written  6.   Please read/review attached documents on mindfulness stress reduction strategies    General Instructions:  Patient to monitor for side effects, worsening symptoms, and/or improvement, report to PMHNP Ranjeet immediatley.  If a sooner appointment is needed please call office at number listed below to schedule.  Please request refills through your pharmacy prior to reaching out to office or through [facility]  Please give office staff (1) week to schedule a referral, if you have not heard anything around that time call office and ask to speak to outgoing referral .    Rolando Menendez   Psychiatric Mental Health Nurse Practitioner (PMHNP)  [address] [place] [address]  P: [phone]  F: [phone]       Counseling Resources:    [employer] [facility]  [address], [address]  [place] [address]  Phone: [phone]  [email or web address]    [facility]   [address], [address] [facility] [address]   Phone: [phone]  [email or web address]    [facility]  [address] [place] [address]  [phone]  [email or web address]    [facility]  [address] [place] [address] [address] [place]  [address]  [phone]  [email or web address]    [address] in [place].  We occupy [address], [address].  [phone]  [email or web address]
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
## 2023-02-03 NOTE — TELEPHONE ENCOUNTER
[facility]: [id number] - [place], [place] - [facility] [address] AT [address]  - [phone] [facility] [phone] FX    Relationship: Pharmacy    Best call back number: [phone]    What medications are you currently taking:   Current Outpatient Medications on File Prior to Visit   Medication Sig Dispense Refill   • ALLERGY SERUM INJECTION Inject  under the skin into the appropriate area as directed 1 (One) Time.     • amLODIPine (NORVASC) 5 MG tablet TAKE ONE TABLET BY MOUTH DAILY  **MUST CALL MD FOR APPOINTMENT 30 tablet 0   • atenolol (TENORMIN) 25 MG tablet TAKE ONE TABLET BY MOUTH DAILY 90 tablet 0   • buPROPion XL (WELLBUTRIN XL) 150 MG 24 hr tablet Take 1 tablet by mouth Daily. 90 tablet 1   • diphenhydrAMINE (BENADRYL) 25 mg capsule Take 1 tablet by mouth Every Night.     • fluticasone (FLONASE) 50 MCG/ACT nasal spray 1 spray into the nostril(s) as directed by provider Daily. PRN ONLY     • hydrOXYzine pamoate (Vistaril) 25 MG capsule ONE OR TWO BY MOUTH AS NEEDED FOR PANIC 14 capsule 0   • ibuprofen (ADVIL,MOTRIN) 200 MG tablet Take 400 mg by mouth daily as needed for mild pain (1-3).     • levonorgestrel (MIRENA) 20 MCG/24HR IUD 1 each by Intrauterine route 1 (one) time.     • [DISCONTINUED] buPROPion XL (WELLBUTRIN XL) 150 MG 24 hr tablet Take 1 tablet by mouth Daily for 90 days. 30 tablet 2   • [DISCONTINUED] busPIRone (BUSPAR) 5 MG tablet Take 1 tablet by mouth 2 (Two) Times a Day As Needed (anxiety/panic). 60 tablet 2     No current facility-administered medications on file prior to visit.        Which medication are you concerned about: HYDROXYZINE 25 MG     What are your concerns: PHARMACY STATES THAT THEY CANNOT FILL THIS MEDICATION WITHOUT A FREQUENCY. PLEASE ADVISE.

## 2023-02-06 ENCOUNTER — TELEPHONE (OUTPATIENT)
Dept: BEHAVIORAL HEALTH | Facility: CLINIC | Age: 48
End: 2023-02-06
Payer: COMMERCIAL

## 2023-02-06 NOTE — TELEPHONE ENCOUNTER
"We received faxed request from pt's pharmacy asking for frequency of pt's hydroxyzine pamoate 25 mg. Directions read \"one or two by mouth as needed for panic.\" Please advise, thank you.  "

## 2023-02-16 DIAGNOSIS — I10 ESSENTIAL HYPERTENSION: ICD-10-CM

## 2023-02-17 RX ORDER — AMLODIPINE BESYLATE 5 MG/1
TABLET ORAL
Qty: 30 TABLET | Refills: 0 | Status: SHIPPED | OUTPATIENT
Start: 2023-02-17

## 2023-04-28 ENCOUNTER — OFFICE VISIT (OUTPATIENT)
Dept: INTERNAL MEDICINE | Facility: CLINIC | Age: 48
End: 2023-04-28
Payer: COMMERCIAL

## 2023-04-28 VITALS
BODY MASS INDEX: 26.63 KG/M2 | DIASTOLIC BLOOD PRESSURE: 86 MMHG | SYSTOLIC BLOOD PRESSURE: 128 MMHG | RESPIRATION RATE: 16 BRPM | OXYGEN SATURATION: 97 % | WEIGHT: 169.7 LBS | HEART RATE: 82 BPM | HEIGHT: 67 IN

## 2023-04-28 DIAGNOSIS — I10 ESSENTIAL HYPERTENSION: ICD-10-CM

## 2023-04-28 DIAGNOSIS — F41.9 ANXIETY: ICD-10-CM

## 2023-04-28 DIAGNOSIS — Z00.00 HEALTHCARE MAINTENANCE: Primary | ICD-10-CM

## 2023-04-28 PROCEDURE — 99396 PREV VISIT EST AGE 40-64: CPT | Performed by: FAMILY MEDICINE

## 2023-04-28 RX ORDER — CETIRIZINE HYDROCHLORIDE 10 MG/1
10 TABLET ORAL DAILY
COMMUNITY

## 2023-04-28 RX ORDER — AMLODIPINE BESYLATE 5 MG/1
5 TABLET ORAL DAILY
Qty: 90 TABLET | Refills: 0 | Status: SHIPPED | OUTPATIENT
Start: 2023-04-28

## 2023-04-28 RX ORDER — ATENOLOL 25 MG/1
25 TABLET ORAL DAILY
Qty: 90 TABLET | Refills: 3 | Status: SHIPPED | OUTPATIENT
Start: 2023-04-28

## 2023-04-28 NOTE — PROGRESS NOTES
Subjective   Alla Hinson is a 47 y.o. female.     Chief Complaint   Patient presents with   • Annual Exam         History of Present Illness   Alla Hinson 47 y.o. female who presents for an Annual Wellness Visit.  she has a history of   Patient Active Problem List   Diagnosis   • Essential hypertension   • Anxiety   • Eczema   • Seasonal allergic rhinitis due to pollen   • Healthcare maintenance   • MDD (major depressive disorder), recurrent episode, moderate   • Generalized anxiety disorder with panic attacks   .  she has been feeling well.   I  reviewed health maintenance with her as part of my preventative care plan.  Recommend regular dental and eye exams  Was told she was iron deficient at recent blood draw for donating blood  The following portions of the patient's history were reviewed and updated as appropriate: allergies, current medications, past family history, past medical history, past social history, past surgical history and problem list.  Stable chronic medical problems of anxiety and hypertension monitor blood pressure goals less than 140/90  Review of Systems   Constitutional: Negative for appetite change, fever and unexpected weight change.   HENT: Negative for ear pain, facial swelling and sore throat.    Eyes: Negative for pain and visual disturbance.   Respiratory: Negative for chest tightness, shortness of breath and wheezing.    Cardiovascular: Negative for chest pain and palpitations.   Gastrointestinal: Negative for abdominal pain and blood in stool.   Endocrine: Negative.    Genitourinary: Negative for difficulty urinating and hematuria.   Musculoskeletal: Negative for joint swelling.   Neurological: Negative for tremors, seizures and syncope.   Hematological: Negative for adenopathy.   Psychiatric/Behavioral: Negative.        Objective   Physical Exam  Vitals and nursing note reviewed.   Constitutional:       Appearance: Normal appearance. She is well-developed. She is not  diaphoretic.   HENT:      Head: Normocephalic and atraumatic.      Right Ear: Tympanic membrane, ear canal and external ear normal.      Left Ear: Tympanic membrane, ear canal and external ear normal.   Eyes:      General: Lids are normal. No scleral icterus.     Extraocular Movements: Extraocular movements intact.      Conjunctiva/sclera: Conjunctivae normal.   Neck:      Thyroid: No thyroid mass or thyromegaly.      Vascular: No carotid bruit or JVD.   Cardiovascular:      Rate and Rhythm: Normal rate and regular rhythm.      Pulses: Normal pulses.           Radial pulses are 2+ on the right side and 2+ on the left side.      Heart sounds: Normal heart sounds. No murmur heard.  Pulmonary:      Effort: Pulmonary effort is normal. No respiratory distress.      Breath sounds: Normal breath sounds.   Abdominal:      Palpations: Abdomen is soft.   Musculoskeletal:      Cervical back: Normal range of motion.      Right lower leg: No edema.      Left lower leg: No edema.   Skin:     General: Skin is warm and dry.      Coloration: Skin is not pale.      Findings: No erythema or rash.   Neurological:      General: No focal deficit present.      Mental Status: She is alert and oriented to person, place, and time.      Sensory: No sensory deficit.      Deep Tendon Reflexes: Reflexes are normal and symmetric.   Psychiatric:         Mood and Affect: Mood normal.         Behavior: Behavior normal. Behavior is cooperative.         Thought Content: Thought content normal.         Judgment: Judgment normal.         Assessment & Plan   Diagnoses and all orders for this visit:    1. Healthcare maintenance (Primary)  -     CBC & Differential; Future  -     Lipid Panel; Future  -     Comprehensive Metabolic Panel; Future    2. Essential hypertension  -     amLODIPine (NORVASC) 5 MG tablet; Take 1 tablet by mouth Daily.  Dispense: 90 tablet; Refill: 0  -     atenolol (TENORMIN) 25 MG tablet; Take 1 tablet by mouth Daily.  Dispense: 90  tablet; Refill: 3    3. Anxiety  -     atenolol (TENORMIN) 25 MG tablet; Take 1 tablet by mouth Daily.  Dispense: 90 tablet; Refill: 3      Continue attempts at healthy lifestyle calorie appropriate diet and regular physical activity  Education provided regarding prevention of serious illness with immunizations  Education provided regarding recommendations for screening for early detection for colorectal screening and mammograms she is due for Pap smear  Follow-up ongoing management of chronic problems otherwise preventatively annually

## 2023-07-25 DIAGNOSIS — I10 ESSENTIAL HYPERTENSION: ICD-10-CM

## 2023-07-25 RX ORDER — AMLODIPINE BESYLATE 5 MG/1
TABLET ORAL
Qty: 90 TABLET | Refills: 0 | Status: SHIPPED | OUTPATIENT
Start: 2023-07-25

## 2023-07-25 NOTE — TELEPHONE ENCOUNTER
Rx Refill Note  Requested Prescriptions     Pending Prescriptions Disp Refills    amLODIPine (NORVASC) 5 MG tablet [Pharmacy Med Name: amLODIPine BESYLATE 5 MG TAB] 90 tablet 0     Sig: TAKE ONE TABLET BY MOUTH DAILY      Last office visit with prescribing clinician: 4/28/2023   Last telemedicine visit with prescribing clinician: Visit date not found   Next office visit with prescribing clinician: Visit date not found                         Would you like a call back once the refill request has been completed: [] Yes [] No    If the office needs to give you a call back, can they leave a voicemail: [] Yes [] No    Loan Becerril MA  07/25/23, 09:55 EDT

## 2023-08-07 RX ORDER — BUPROPION HYDROCHLORIDE 150 MG/1
150 TABLET ORAL DAILY
Qty: 90 TABLET | Refills: 0 | Status: SHIPPED | OUTPATIENT
Start: 2023-08-07 | End: 2023-08-11 | Stop reason: SDUPTHER

## 2023-08-07 RX ORDER — BUPROPION HYDROCHLORIDE 150 MG/1
150 TABLET ORAL DAILY
Qty: 90 TABLET | Refills: 0 | Status: CANCELLED | OUTPATIENT
Start: 2023-08-07

## 2023-08-10 NOTE — PROGRESS NOTES
Patient assessed today via MyChart through [facility] pt is at home in a secure environment and verbalizes privacy during interview. JCARLOS Pollack is at home in a secure environment using a secure laptop.The patient's condition being diagnosed/treated is appropriate for telemedicine.The provider identified himself as well as his credentials.The patient, and/or patient's guardian, consent to be seen remotely, and when consent is given they understand that the consent allows for patient identifiable information to be sent to a third party as needed. They may refuse to be seen remotely at any time. The electronic data is encrypted and password protected, and the patient and/or guardian has been advised of the potential risks to privacy not withstanding such measures.    [facility] PROGRESS NOTE  HANNAH MENENDEZ [facility]  [address], [place] [address]    NAME: Alla Hinson     : 1975   MRN: [id number]     Patient Care Team:  Neeraj Rodriguez MD as PCP - General (Family Medicine)  Hannah Menendez APRN as Nurse Practitioner (Psychiatry)    DATE: 2023    Subjective     No chief complaint on file.     HPI:  47 y.o. female established patient of Ranjeet Shon [facility] seen today for F/U.  Patient presents today with multiple issues, poor concentration/focus likely tied to menopause, also endorses hot flashes, poor focus/concentration is interfering with her work, patient interested in increasing dose of Wellbutrin, patient educated on risk versus benefit of ADHD like medications and risk for worsening anxiety, patient has trialed a handful of other meds for anxiety that were not effective or led to side effects.  Patient educated on risk versus benefit of Xanax, controlled substance status, agreed to write her a short term supply while messing with Wellbutrin dosage, consider switching to Strattera if not effective.  Patient verbalized understanding agreed to follow-up in 1 month.    Prior  psychiatric medications:  Effexor, was on for several years, benefit reported in the beginning  Zoloft, dose/duration unknown  Lexapro, dose/duration unknown  BuSpar 5 mg, patient took for 1 day and felt jittery  Hydroxyzine, not effective/sedation    Specialty Problems          Psychiatry Problems    MDD (major depressive disorder), recurrent episode, moderate        Generalized anxiety disorder with panic attacks          Social History     Social History Narrative    Patient has been  for 20 years, spouse's name is Ronaldo, has 2 daughters ages 13 and 15, patient has a [employer] degree works in environmental technology for the [employer], 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      Social History     Occupational History    Not on file   Tobacco Use    Smoking status: Former     Packs/day: 0.50     Years: 3.00     Pack years: 1.50     Types: Cigarettes    Smokeless tobacco: Never    Tobacco comments:     College years   Substance and Sexual Activity    Alcohol use: Yes     Alcohol/week: 7.0 standard drinks     Types: 4 Glasses of wine, 3 Cans of beer per week     Comment: 1 or less per day of wine or beer    Drug use: Never    Sexual activity: Yes     Partners: Male     Birth control/protection: I.U.D.     Family History   Problem Relation Age of Onset    No Known Problems Mother     Hypertension Father       Past Medical History:   Diagnosis Date    Allergic     Anxiety     Tried meds previously    Depression     On and off for several years    Depression with anxiety 06/21/2022    Hypertension     Ovarian cyst      Review of Systems   Constitutional:  Positive for diaphoresis and fatigue.   Respiratory:  Positive for chest tightness. Negative for shortness of breath.         Anxiety related   Cardiovascular:  Positive for palpitations. Negative for chest pain.   Neurological:  Negative for dizziness and light-headedness.   Psychiatric/Behavioral:  Positive for decreased  concentration and stress. Negative for sleep disturbance and suicidal ideas. The patient is nervous/anxious.       Objective   PHYSICAL EXAM:  Constitutional:       Normal appearance, appears in no acute distress  Head:      Head: Normocephalic.   Pulmonary:      Effort: Pulmonary effort is normal, no cough observed   Neurological:      Mental Status: He/she/they are A/Ax3  Skin:         Skin is dry    There were no vitals taken for this visit.  No LMP recorded. Patient has had an implant.    PHQ-9 Depression Screening  Little interest or pleasure in doing things? (P) 0-->not at all   Feeling down, depressed, or hopeless? (P) 0-->not at all   Trouble falling or staying asleep, or sleeping too much? (P) 1-->several days   Feeling tired or having little energy?     Poor appetite or overeating? (P) 1-->several days   Feeling bad about yourself/you are a failure/have let yourself or your family down? (P) 1-->several days   Trouble concentrating on things? (P) 3-->nearly every day   Psychomotor agitation/retardation (P) 1-->several days   Thoughts about death/dying/suicide (P) 0-->not at all   PHQ-9 Total Score (P) 9   How difficult have these problems for you? (P) somewhat difficult     GAD7 Documentation:  Feeling nervous, anxious or on edge (P) 2   Not being able to stop or control worrying (P) 1   Worrying too much about different things (P) 2   Trouble relaxing (P) 1   Being so restless that it is hard to sit still (P) 1   Becoming easily annoyed or irritable (P) 2   Feeling Afraid as if something awful might happen (P) 1   KEI Total Score (P) 10   How difficult have these problems made it for you? (P) Somewhat difficult     MENTAL STATUS EXAM   General Appearance:  Cleanly groomed and dressed  Eye Contact:  Good eye contact  Attitude:  Cooperative  Motor Activity:  Normal gait, posture  Speech:  Normal rate, tone, volume  Mood and affect:  Normal, pleasant  Thought Process:  Goal-directed  Associations/ Thought  Content:  No delusions  Hallucinations:  None  Suicidal Ideations:  Not present  Homicidal Ideation:  Not present  Orientation:  Person, place, time and situation  Fund of Knowledge:  Appropriate for age and educational level  Intellectual Functioning:  Average range  Insight:  Fair  Judgement:  Fair  Reliability:  Fair  Impulse Control:  Fair     LABS:     ALLERGY:  No Known Allergies     Current Outpatient Medications on File Prior to Visit   Medication Sig    ALLERGY SERUM INJECTION Inject  under the skin into the appropriate area as directed 1 (One) Time.    amLODIPine (NORVASC) 5 MG tablet TAKE ONE TABLET BY MOUTH DAILY    atenolol (TENORMIN) 25 MG tablet Take 1 tablet by mouth Daily.    cetirizine (zyrTEC) 10 MG tablet Take 1 tablet by mouth Daily.    diphenhydrAMINE (BENADRYL) 25 mg capsule Take 1 capsule by mouth Every Night.    fluticasone (FLONASE) 50 MCG/ACT nasal spray 1 spray into the nostril(s) as directed by provider Daily. PRN ONLY    ibuprofen (ADVIL,MOTRIN) 200 MG tablet Take 2 tablets by mouth Daily As Needed for Mild Pain.    levonorgestrel (MIRENA) 20 MCG/24HR IUD 1 each by Intrauterine route 1 (One) Time.    [DISCONTINUED] buPROPion XL (WELLBUTRIN XL) 150 MG 24 hr tablet Take 1 tablet by mouth Daily. ADDITIONAL REFILLS NEED MD APPT    [DISCONTINUED] hydrOXYzine pamoate (Vistaril) 25 MG capsule ONE OR TWO BY MOUTH AS NEEDED DAILY FOR PANIC FOR 1 WEEK (Patient taking differently: 1 capsule. PRN)     No current facility-administered medications on file prior to visit.      Assessment    ASSESSMENT/PLAN/INSTRUCTIONS/EDUCATION    (F33.1) MDD (major depressive disorder), recurrent episode, moderate - Plan: buPROPion XL (WELLBUTRIN XL) 300 MG 24 hr tablet    (F41.1,  F41.0) Generalized anxiety disorder with panic attacks - Plan: buPROPion XL (WELLBUTRIN XL) 300 MG 24 hr tablet, ALPRAZolam (Xanax) 0.25 MG tablet    (R41.840) Poor concentration - Plan: buPROPion XL (WELLBUTRIN XL) 300 MG 24 hr  tablet    (N95.1) Menopausal symptoms (focus/concentration/hot flashes) - Plan: buPROPion XL (WELLBUTRIN XL) 300 MG 24 hr tablet     -The above listed condition/conditions are worse, med changes per orders. Pt instructed to monitor for improvement/worsening S/S and report to Ranjeet immediately. Condition will be re-assessed at next F/U appt.     -Please call the office with any worsening of symptoms or onset of intolerable side effects. Patient is agreeable to call 911 or go to the nearest ER should he/she/they have any thoughts of harm to self or others.    -Patient has been educated regarding multimodal approach with healthy nutrition, healthy sleep, regular physical activity, social activities, counseling, and medications.     PT INSTRUCTIONS FROM AVS:  Medication changes: Double dose of Wellbutrin, begin taking 300 mg a day first thing in the morning.   Okay to start Xanax 0.25 mg, #10 a month, short-term supply for acute anxiety/panic, first time taking this medication be at home in a safe environment, do not take then drive a car or do something where you can injure yourself, medication might be mildly sedating, do not mix with alcohol or other sedating medications, patient educated on risk versus benefit.  If higher dose of Wellbutrin is not effective consider switching to Strattera.      Return in 1 month (on 9/11/2023) for Medication Check.    Future Appointments         Provider Department Center    9/11/2023 9:30 AM Rolando Menendez APRN [facility] [place]           Medications Discontinued During This Encounter   Medication Reason    hydrOXYzine pamoate (Vistaril) 25 MG capsule Not Efficacious    buPROPion XL (WELLBUTRIN XL) 150 MG 24 hr tablet Reorder     New Medications Ordered This Visit   Medications    buPROPion XL (WELLBUTRIN XL) 300 MG 24 hr tablet     Sig: Take 1 tablet by mouth Every Morning.     Dispense:  30 tablet     Refill:  0    ALPRAZolam (Xanax) 0.25 MG  tablet     Sig: Take 1 tablet by mouth Daily As Needed for Anxiety for up to 30 days. (10 A MONTH MAX)     Dispense:  10 tablet     Refill:  0      No orders of the defined types were placed in this encounter.    -Barriers:  menopausal S/S, history of multiple failed medications due to lack of efficacy and/or side effects , and high risk medication  -Strengths: humor and motivated     -Progress toward goal: Not at goal  -Functional Status: Moderate impairment   -Prognosis: Fair with Ongoing Treatment        SUMMARY/DISCUSSION:  Pt past social/medical/family history reviewed/updated. ROS conducted, medications/allergies, reviewed, patient was screened today for depression/anxiety, PHQ/KEI scores reviewed.  Most recent vitals/labs reviewed. Pt was given appropriate time to ask questions and concerns were addressed. A thorough discussion was had that included review of disease process, need for continued monitoring and additional treatment options including use of pharmacological and non-pharmacological approaches to care, decisions were made and agreed upon by patient and provider. Discussed the risks, benefits, and potential side effects of the medications; patient ackowledged and verbally consented.     Part of this note may be an electronic transcription/translation of spoken language to printed text using the Dragon Dictation System. Some of the data in this electronic note has been brought forward from a previous encounter, any necessary changes have been made, it has been reviewed by this APRN, and it is accurate.    This document has been electronically signed by RODGER Hein August 11, 2023 11:32 EDT

## 2023-08-11 ENCOUNTER — TELEMEDICINE (OUTPATIENT)
Dept: BEHAVIORAL HEALTH | Facility: CLINIC | Age: 48
End: 2023-08-11
Payer: COMMERCIAL

## 2023-08-11 DIAGNOSIS — R41.840 POOR CONCENTRATION: ICD-10-CM

## 2023-08-11 DIAGNOSIS — F33.1 MDD (MAJOR DEPRESSIVE DISORDER), RECURRENT EPISODE, MODERATE: Primary | ICD-10-CM

## 2023-08-11 DIAGNOSIS — F41.1 GENERALIZED ANXIETY DISORDER WITH PANIC ATTACKS: ICD-10-CM

## 2023-08-11 DIAGNOSIS — N95.1 MENOPAUSAL STATE: ICD-10-CM

## 2023-08-11 DIAGNOSIS — F41.0 GENERALIZED ANXIETY DISORDER WITH PANIC ATTACKS: ICD-10-CM

## 2023-08-11 RX ORDER — ALPRAZOLAM 0.25 MG/1
0.25 TABLET ORAL DAILY PRN
Qty: 10 TABLET | Refills: 0 | Status: SHIPPED | OUTPATIENT
Start: 2023-08-11 | End: 2023-09-10

## 2023-08-11 RX ORDER — BUPROPION HYDROCHLORIDE 300 MG/1
300 TABLET ORAL EVERY MORNING
Qty: 30 TABLET | Refills: 0 | Status: SHIPPED | OUTPATIENT
Start: 2023-08-11

## 2023-08-11 NOTE — PATIENT INSTRUCTIONS
Medication changes: Double dose of Wellbutrin, begin taking 300 mg a day first thing in the morning.   Okay to start Xanax 0.25 mg, #10 a month, short-term supply for acute anxiety/panic, first time taking this medication be at home in a safe environment, do not take then drive a car or do something where you can injure yourself, medication might be mildly sedating, do not mix with alcohol or other sedating medications, patient educated on risk versus benefit.  If higher dose of Wellbutrin is not effective consider switching to Strattera.

## 2023-09-11 ENCOUNTER — TELEMEDICINE (OUTPATIENT)
Dept: BEHAVIORAL HEALTH | Facility: CLINIC | Age: 48
End: 2023-09-11
Payer: COMMERCIAL

## 2023-09-11 DIAGNOSIS — F33.1 MDD (MAJOR DEPRESSIVE DISORDER), RECURRENT EPISODE, MODERATE: ICD-10-CM

## 2023-09-11 DIAGNOSIS — R41.840 POOR CONCENTRATION: ICD-10-CM

## 2023-09-11 DIAGNOSIS — T88.7XXA SIDE EFFECT OF MEDICATION: ICD-10-CM

## 2023-09-11 DIAGNOSIS — F41.0 GENERALIZED ANXIETY DISORDER WITH PANIC ATTACKS: Primary | ICD-10-CM

## 2023-09-11 DIAGNOSIS — F41.1 GENERALIZED ANXIETY DISORDER WITH PANIC ATTACKS: Primary | ICD-10-CM

## 2023-09-11 PROCEDURE — 99214 OFFICE O/P EST MOD 30 MIN: CPT

## 2023-09-11 NOTE — PATIENT INSTRUCTIONS
Medication changes: Reduce Wellbutrin to 150 mg a day X 2 weeks and then take every other day X 1 week then discontinue medication.  At the same time okay to start BuSpar/buspirone 5 mg take twice a day with a snack at first.  Patient educated on the risk versus benefit of medication's used to help focus/concentration and risk of worsening anxiety.  Patient interested in coming completely off the Wellbutrin today    GENERAL NEW PATIENT INSTRUCTIONS:  -The best way to get a hold of Ranjeet is to call the office at [phone] and ask to leave a message with his medical assistant.  Patient's are not able to message their mental health provider directly via Fio, please give my office up to 48 hours to respond to a patient call/question/refill request.  -Please call 911 or go to the nearest ER if you begin to have thoughts of harming yourself or other people.  -Refill requests will be made during normal office hours, Monday-Friday 8:00-5:30.  Ranjeet is out of the office on Wednesdays and weekends.  Follow-up appointments must be maintained in order for prescribing to continue.    -Tuesdays and Thursdays are Ranjeet's in-office days, Mondays and Fridays are his telehealth days.  The decision to be seen virtually or in person is up to the discretion of the provider, not all behavioral health problems are appropriate for telehealth.    NO SHOW POLICY:  We understand unexpected circumstances arise; however, anytime you miss your appointment we are unable to provide you appropriate care.  In addition, each appointment missed could have been used to provide care for others.  We ask that you call at least 24 hours in advance to cancel or reschedule an appointment. We would like to take this opportunity to remind you of our policy stating patients who miss THREE or more appointments without cancelling or rescheduling 24 hours in advance of the appointment may be subject to cancellation of any further visits with our clinic and  recommendation to seek in-person services/visits. Please call [phone] to reschedule your appointment. If there are reasons that make it difficult for you to keep the appointments, please call and let us know how we can help. Please understand that medication prescribing will not continue without seeing your provider.       [facility]   [address] [place], [place] [address]  P: [phone]  F: [phone]

## 2023-09-11 NOTE — PROGRESS NOTES
Patient assessed today via MyChart through EPIC pt is at home in a secure environment and verbalizes privacy during interview. JCARLOS Pollack is at home in a secure environment using a secure laptop.The patient's condition being diagnosed/treated is appropriate for telemedicine.The provider identified himself as well as his credentials.The patient, and/or patient's guardian, consent to be seen remotely, and when consent is given they understand that the consent allows for patient identifiable information to be sent to a third party as needed. They may refuse to be seen remotely at any time. The electronic data is encrypted and password protected, and the patient and/or guardian has been advised of the potential risks to privacy not withstanding such measures.    [facility] PROGRESS NOTE  [facility]ANNAH SAMAYOA [facility]  [address], [place] [address]    NAME: Alla Hinson     : 1975   MRN: [id number]   PCP: Neeraj Rodriguez MD     DATE: 2023    ALLERGY:Patient has no known allergies.     MEDICATIONS:  Allergy serum injection  amLODIPine  atenolol  buPROPion XL  busPIRone  cetirizine  diphenhydrAMINE  fluticasone  ibuprofen  levonorgestrel     VITALS: There were no vitals taken for this visit. No LMP recorded. Patient has had an implant.     Subjective     Chief Complaint   Patient presents with    Anxiety    Medication Problem    Follow-up      HPI:  47 y.o. female patient seen for follow up. Patient last seen 1 month ago at that time patient was interested in increasing dose of Wellbutrin due to worsening concentration/focus tied to menopause, since that time patient reports feeling jittery and irritable is not liking the higher dose, hot flashes do not appear to be a main issue today, main issue today appears to be anxiety, anxiety was worse at last appointment and patient was given #10 Xanax to use on a as needed basis, patient reports she picked bottle up but never tried it due to fear of  sedation.    Prior psychiatric medications:  Wellbutrin: Side effects at 300 mg  Effexor, was on for several years, benefit reported in the beginning  Zoloft, dose/duration unknown  Lexapro, dose/duration unknown  BuSpar 5 mg, patient took for 1 day and felt jittery  Hydroxyzine, not effective/sedation    Social Status:  Ethnic Group: Not  Or   Race: White Or   Marital Status:    Employment status: Full Time  [employer] [place]       SUBSTANCE/SEXUAL HISTORY:   reports that she has quit smoking. Her smoking use included cigarettes. She has a 1.50 pack-year smoking history. She has never used smokeless tobacco. She reports current alcohol use of about 7.0 standard drinks per week. She reports that she does not use drugs.   reports being sexually active and has had partner(s) who are male. She reports using the following method of birth control/protection: I.U.D..      FAMILY HISTORY:  family history includes Hypertension in her father; No Known Problems in her mother.     PAST MEDICAL HISTORY   has a past medical history of Allergic, Anxiety, Depression, Depression with anxiety (06/21/2022), Hypertension, and Ovarian cyst.    She has no past medical history of Alcohol abuse, Alcoholism, Anorexia nervosa, Autism spectrum disorder, Bipolar disorder, Borderline personality disorder, Bulimia nervosa, Disease of thyroid gland, Head injury, HIV disease, Liver disease, Obsessive-compulsive disorder, Oppositional defiant disorder, Panic disorder, Peripheral neuropathy, Psychosis, PTSD (post-traumatic stress disorder), Seizures, Self-injurious behavior, Substance abuse, Suicide attempt, or Violence, history of.     Review of Systems   Constitutional: Negative.  Negative for chills and fever.   Respiratory:  Negative for chest tightness and shortness of breath.    Cardiovascular:  Negative for chest pain.   Gastrointestinal:  Negative for nausea and vomiting.   Neurological:   Negative for dizziness and light-headedness.   Psychiatric/Behavioral:  Positive for decreased concentration and stress. Negative for sleep disturbance and suicidal ideas. The patient is nervous/anxious.      Objective   Physical Exam  Constitutional:       Appearance: Normal appearance.   HENT:      Head: Normocephalic.   Pulmonary:      Effort: Pulmonary effort is normal.   Skin:     General: Skin is dry.   Neurological:      General: No focal deficit present.      Mental Status: She is alert and oriented to person, place, and time.   Psychiatric:         Mood and Affect: Mood normal.         Behavior: Behavior normal.         Thought Content: Thought content normal.     PHQ-9 Depression Screening  Little interest or pleasure in doing things? (P) 0-->not at all   Feeling down, depressed, or hopeless? (P) 1-->several days   Trouble falling or staying asleep, or sleeping too much? (P) 1-->several days   Feeling tired or having little energy?     Poor appetite or overeating? (P) 1-->several days   Feeling bad about yourself/you are a failure/have let yourself or your family down? (P) 1-->several days   Trouble concentrating on things? (P) 1-->several days   Psychomotor agitation/retardation (P) 1-->several days   Thoughts about death/dying/suicide (P) 0-->not at all   PHQ-9 Total Score (P) 8   How difficult have these problems for you? (P) somewhat difficult     GAD7 Documentation:  Feeling nervous, anxious or on edge (P) 1   Not being able to stop or control worrying (P) 1   Worrying too much about different things (P) 1   Trouble relaxing (P) 0   Being so restless that it is hard to sit still (P) 1   Becoming easily annoyed or irritable (P) 1   Feeling Afraid as if something awful might happen (P) 0   KEI Total Score (P) 5   How difficult have these problems made it for you? (P) Somewhat difficult     MENTAL STATUS EXAM   General Appearance:  Cleanly groomed and dressed  Eye Contact:  Good eye contact  Attitude:   Cooperative  Motor Activity:  Normal gait, posture  Speech:  Normal rate, tone, volume  Mood and affect:  Anxious  Thought Process:  Goal-directed  Associations/ Thought Content:  No delusions  Hallucinations:  None  Suicidal Ideations:  Not present  Homicidal Ideation:  Not present  Orientation:  Person, place, time and situation  Fund of Knowledge:  Appropriate for age and educational level  Intellectual Functioning:  Average range  Insight:  Fair  Judgement:  Fair  Reliability:  Fair  Impulse Control:  Fair     LABS:     No visits with results within 1 Month(s) from this visit.   Latest known visit with results is:   Office Visit on 01/14/2022   Component Date Value Ref Range Status    Glucose 01/14/2022 82  65 - 99 mg/dL Final    BUN 01/14/2022 12  6 - 20 mg/dL Final    Creatinine 01/14/2022 0.79  0.57 - 1.00 mg/dL Final    Sodium 01/14/2022 141  136 - 145 mmol/L Final    Potassium 01/14/2022 4.2  3.5 - 5.2 mmol/L Final    Chloride 01/14/2022 104  98 - 107 mmol/L Final    CO2 01/14/2022 26.5  22.0 - 29.0 mmol/L Final    Calcium 01/14/2022 9.5  8.6 - 10.5 mg/dL Final    Total Protein 01/14/2022 7.0  6.0 - 8.5 g/dL Final    Albumin 01/14/2022 4.70  3.50 - 5.20 g/dL Final    ALT (SGPT) 01/14/2022 33  1 - 33 U/L Final    AST (SGOT) 01/14/2022 25  1 - 32 U/L Final    Alkaline Phosphatase 01/14/2022 41  39 - 117 U/L Final    Total Bilirubin 01/14/2022 0.4  0.0 - 1.2 mg/dL Final    eGFR Non African Amer 01/14/2022 78  >60 mL/min/1.73 Final    Globulin 01/14/2022 2.3  gm/dL Final    A/G Ratio 01/14/2022 2.0  g/dL Final    BUN/Creatinine Ratio 01/14/2022 15.2  7.0 - 25.0 Final    Anion Gap 01/14/2022 10.5  5.0 - 15.0 mmol/L Final    TSH 01/14/2022 1.770  0.270 - 4.200 uIU/mL Final    Free T4 01/14/2022 1.22  0.93 - 1.70 ng/dL Final    Total Cholesterol 01/14/2022 218 (H)  0 - 200 mg/dL Final    Triglycerides 01/14/2022 91  0 - 150 mg/dL Final    HDL Cholesterol 01/14/2022 66 (H)  40 - 60 mg/dL Final    LDL Cholesterol   01/14/2022 136 (H)  0 - 100 mg/dL Final    VLDL Cholesterol 01/14/2022 16  5 - 40 mg/dL Final    LDL/HDL Ratio 01/14/2022 2.03   Final    25 Hydroxy, Vitamin D 01/14/2022 30.0  30.0 - 100.0 ng/ml Final       Assessment    ASSESSMENT/TREATMENT PLAN/INSTRUCTIONS/EDUCATION    (F41.1,  F41.0) Generalized anxiety disorder with panic attacks - Plan: busPIRone (BUSPAR) 5 MG tablet    (F33.1) MDD (major depressive disorder), recurrent episode, moderate    (R41.840) Poor concentration    (T88.7XXA) Side effect of medication     -Depression: Stable  -Anxiety: Worsening with higher dose of Wellbutrin + side effects  -Poor concentration: Unchanged    AFTER VISIT SUMMARY INSTRUCTIONS:    Medication changes: Reduce Wellbutrin to 150 mg a day X 2 weeks and then take every other day X 1 week then discontinue medication.  At the same time okay to start BuSpar/buspirone 5 mg take twice a day with a snack at first.  Patient educated on the risk versus benefit of medication's used to help focus/concentration and risk of worsening anxiety.  Patient interested in coming completely off the Wellbutrin today    -Please call the office at [phone] with any worsening of symptoms or onset of intolerable side effects, please ask to leave a message with Ranjeet's medical assistant.  Please give my office up to 48 hours to respond to a patient call/question/refill request.  -Patient is agreeable to call 911 or go to the nearest ER should he/she/they have any thoughts of harm to self or others.  -Patient has been educated on providers schedule, contact information, and patient/provider expectations.   -Patient has been educated regarding multimodal approach with healthy nutrition, healthy sleep, regular physical activity, social activities, counseling, and medications.     Return in 1 month (on 10/11/2023) for Medication Check.    MEDICATION ISSUES:  JORGE: Reviewed 09/11/2023     There are no discontinued medications.  New Medications Ordered  This Visit   Medications    busPIRone (BUSPAR) 5 MG tablet     Sig: Take 2 tablets by mouth 2 (Two) Times a Day.     Dispense:  60 tablet     Refill:  0      No orders of the defined types were placed in this encounter.     -Barriers: history of multiple failed medications due to lack of efficacy and/or side effects  and stress  -Strengths:  humor and positive attitude    -Progress toward goal: Not at goal  -Functional Status: Mild impairment   -Prognosis: Fair with Ongoing Treatment        SUMMARY/DISCUSSION:  Pt past social/medical/family history reviewed/updated. ROS conducted, medications/allergies, reviewed, patient was screened today for depression/anxiety, PHQ/KEI scores reviewed.  Most recent vitals/labs reviewed. Pt was given appropriate time to ask questions and concerns were addressed. A thorough discussion was had that included review of disease process, need for continued monitoring and additional treatment options including use of pharmacological and non-pharmacological approaches to care, decisions were made and agreed upon by patient and provider. Discussed the risks, benefits, and potential side effects of the medications; patient ackowledged and verbally consented.     Part of this note may be an electronic transcription/translation of spoken language to printed text using the Dragon Dictation System. Some of the data in this electronic note has been brought forward from a previous encounter, any necessary changes have been made, it has been reviewed by this APRN, and it is accurate.    This document has been electronically signed by RODGER Hein September 12, 2023 12:21 EDT

## 2023-09-12 RX ORDER — BUSPIRONE HYDROCHLORIDE 5 MG/1
5 TABLET ORAL 3 TIMES DAILY
Status: CANCELLED | OUTPATIENT
Start: 2023-09-12

## 2023-09-12 RX ORDER — BUSPIRONE HYDROCHLORIDE 5 MG/1
10 TABLET ORAL 2 TIMES DAILY
Qty: 60 TABLET | Refills: 0 | Status: SHIPPED | OUTPATIENT
Start: 2023-09-12

## 2023-09-12 NOTE — TELEPHONE ENCOUNTER
Pt called thinking that Ranjeet was going to call in a new script for Buspar yesterday. Pt uses [facility] [address] @ [address]

## 2023-10-13 ENCOUNTER — TELEMEDICINE (OUTPATIENT)
Dept: BEHAVIORAL HEALTH | Facility: CLINIC | Age: 48
End: 2023-10-13
Payer: COMMERCIAL

## 2023-10-13 DIAGNOSIS — Z56.6 STRESS AT WORK: ICD-10-CM

## 2023-10-13 DIAGNOSIS — F41.0 GENERALIZED ANXIETY DISORDER WITH PANIC ATTACKS: Primary | ICD-10-CM

## 2023-10-13 DIAGNOSIS — F41.1 GENERALIZED ANXIETY DISORDER WITH PANIC ATTACKS: Primary | ICD-10-CM

## 2023-10-13 RX ORDER — BUSPIRONE HYDROCHLORIDE 10 MG/1
10 TABLET ORAL 2 TIMES DAILY
Qty: 60 TABLET | Refills: 1 | Status: SHIPPED | OUTPATIENT
Start: 2023-10-13

## 2023-10-13 RX ORDER — ALPRAZOLAM 0.25 MG/1
0.25 TABLET ORAL DAILY PRN
Qty: 15 TABLET | Refills: 1 | Status: SHIPPED | OUTPATIENT
Start: 2023-10-13

## 2023-10-13 SDOH — HEALTH STABILITY - MENTAL HEALTH: OTHER PHYSICAL AND MENTAL STRAIN RELATED TO WORK: Z56.6

## 2023-10-13 NOTE — PROGRESS NOTES
Patient assessed today via MyChart through EPIC pt is at home in a secure environment and verbalizes privacy during interview. JCARLOS Pollack is at home in a secure environment using a secure laptop.The patient's condition being diagnosed/treated is appropriate for telemedicine.The provider identified himself as well as his credentials.The patient, and/or patient's guardian, consent to be seen remotely, and when consent is given they understand that the consent allows for patient identifiable information to be sent to a third party as needed. They may refuse to be seen remotely at any time. The electronic data is encrypted and password protected, and the patient and/or guardian has been advised of the potential risks to privacy not withstanding such measures.    [facility] PROGRESS NOTE  [facility]  [address], [place] [address]    NAME: Alla Hinson     : 1975   MRN: [id number]     DATE: 10/13/2023    ALLERGY:Patient has no known allergies.     MEDICATIONS:  Allergy serum injection  ALPRAZolam  amLODIPine  atenolol  busPIRone  cetirizine  diphenhydrAMINE  fluticasone  ibuprofen  levonorgestrel     VITALS: There were no vitals taken for this visit. No LMP recorded. Patient has had an implant.     Subjective     Chief Complaint   Patient presents with    Anxiety    Medication Problem      HPI:  47 y.o. female patient seen for follow up. Patient last seen 1 month ago at that time BuSpar was ordered twice daily for anxiety and short-term supply of Xanax was given while adjusting meds, since that time patient reports worsening irritability/nausea the first week on the BuSpar that has subsided, is only taking 5 mg twice daily.  Patient recently took daughters to [employer] in [place] that was enjoyable.    Prior psychiatric medications:  Wellbutrin: Side effects at 300 mg  Effexor, was on for several years, benefit reported in the beginning  Zoloft, dose/duration  unknown  Lexapro, dose/duration unknown  BuSpar 5 mg, patient took for 1 day and felt jittery  Hydroxyzine, not effective/sedation      SUBSTANCE/SEXUAL HISTORY:  Social History     Socioeconomic History    Marital status:      Spouse name: Ronaldo    Number of children: 2    Highest education level: Bachelor's degree (e.g., BA, AB, BS)   Tobacco Use    Smoking status: Former     Packs/day: 0.50     Years: 3.00     Additional pack years: 0.00     Total pack years: 1.50     Types: Cigarettes    Smokeless tobacco: Never    Tobacco comments:     College years   Substance and Sexual Activity    Alcohol use: Yes     Alcohol/week: 7.0 standard drinks of alcohol     Types: 4 Glasses of wine, 3 Cans of beer per week     Comment: 1 or less per day of wine or beer    Drug use: Never    Sexual activity: Yes     Partners: Male     Birth control/protection: I.U.D.      FAMILY HISTORY:  family history includes Hypertension in her father; No Known Problems in her mother.     PAST MEDICAL HISTORY   has a past medical history of Allergic, Anxiety, Depression, Depression with anxiety (06/21/2022), Hypertension, and Ovarian cyst.    She has no past medical history of Alcohol abuse, Alcoholism, Anorexia nervosa, Autism spectrum disorder, Bipolar disorder, Borderline personality disorder, Bulimia nervosa, Disease of thyroid gland, Head injury, HIV disease, Liver disease, Obsessive-compulsive disorder, Oppositional defiant disorder, Panic disorder, Peripheral neuropathy, Psychosis, PTSD (post-traumatic stress disorder), Seizures, Self-injurious behavior, Substance abuse, Suicide attempt, or Violence, history of.     Review of Systems   Constitutional: Negative.  Negative for chills and fever.   Respiratory:  Negative for chest tightness and shortness of breath.    Cardiovascular:  Negative for chest pain.   Gastrointestinal:  Positive for nausea.   Neurological:  Negative for dizziness and light-headedness.   Psychiatric/Behavioral:   Positive for agitation and stress. Negative for decreased concentration, sleep disturbance and suicidal ideas. The patient is nervous/anxious.        Objective   Physical Exam  Constitutional:       Appearance: Normal appearance.   HENT:      Head: Normocephalic.   Pulmonary:      Effort: Pulmonary effort is normal.   Skin:     General: Skin is dry.   Neurological:      General: No focal deficit present.      Mental Status: She is alert and oriented to person, place, and time.   Psychiatric:         Mood and Affect: Mood normal.         Behavior: Behavior normal.         Thought Content: Thought content normal.       PHQ-9 Depression Screening  Little interest or pleasure in doing things? (P) 1-->several days   Feeling down, depressed, or hopeless? (P) 2-->more than half the days   Trouble falling or staying asleep, or sleeping too much? (P) 1-->several days   Feeling tired or having little energy?     Poor appetite or overeating? (P) 1-->several days   Feeling bad about yourself/you are a failure/have let yourself or your family down? (P) 1-->several days   Trouble concentrating on things? (P) 2-->more than half the days   Psychomotor agitation/retardation (P) 0-->not at all   Thoughts about death/dying/suicide (P) 0-->not at all   PHQ-9 Total Score (P) 10   How difficult have these problems for you? (P) somewhat difficult     GAD7 Documentation:  Feeling nervous, anxious or on edge (P) 1   Not being able to stop or control worrying (P) 1   Worrying too much about different things (P) 1   Trouble relaxing (P) 1   Being so restless that it is hard to sit still (P) 1   Becoming easily annoyed or irritable (P) 2   Feeling Afraid as if something awful might happen (P) 0   KEI Total Score (P) 7   How difficult have these problems made it for you? (P) Somewhat difficult     MENTAL STATUS EXAM   General Appearance:  Cleanly groomed and dressed  Eye Contact:  Good eye contact  Attitude:  Cooperative  Speech:  Normal rate,  tone, volume  Mood and affect:  Anxious  Thought Process:  Goal-directed  Associations/ Thought Content:  No delusions  Hallucinations:  None  Suicidal Ideations:  Not present  Homicidal Ideation:  Not present  Sensorium:  Alert  Orientation:  Person, place, time and situation  Fund of Knowledge:  Appropriate for age and educational level  Intellectual Functioning:  Average range  Insight:  Fair  Judgement:  Fair  Reliability:  Fair  Impulse Control:  Fair     LABS:     No visits with results within 1 Month(s) from this visit.   Latest known visit with results is:   Office Visit on 01/14/2022   Component Date Value Ref Range Status    Glucose 01/14/2022 82  65 - 99 mg/dL Final    BUN 01/14/2022 12  6 - 20 mg/dL Final    Creatinine 01/14/2022 0.79  0.57 - 1.00 mg/dL Final    Sodium 01/14/2022 141  136 - 145 mmol/L Final    Potassium 01/14/2022 4.2  3.5 - 5.2 mmol/L Final    Chloride 01/14/2022 104  98 - 107 mmol/L Final    CO2 01/14/2022 26.5  22.0 - 29.0 mmol/L Final    Calcium 01/14/2022 9.5  8.6 - 10.5 mg/dL Final    Total Protein 01/14/2022 7.0  6.0 - 8.5 g/dL Final    Albumin 01/14/2022 4.70  3.50 - 5.20 g/dL Final    ALT (SGPT) 01/14/2022 33  1 - 33 U/L Final    AST (SGOT) 01/14/2022 25  1 - 32 U/L Final    Alkaline Phosphatase 01/14/2022 41  39 - 117 U/L Final    Total Bilirubin 01/14/2022 0.4  0.0 - 1.2 mg/dL Final    eGFR Non African Amer 01/14/2022 78  >60 mL/min/1.73 Final    Globulin 01/14/2022 2.3  gm/dL Final    A/G Ratio 01/14/2022 2.0  g/dL Final    BUN/Creatinine Ratio 01/14/2022 15.2  7.0 - 25.0 Final    Anion Gap 01/14/2022 10.5  5.0 - 15.0 mmol/L Final    TSH 01/14/2022 1.770  0.270 - 4.200 uIU/mL Final    Free T4 01/14/2022 1.22  0.93 - 1.70 ng/dL Final    Total Cholesterol 01/14/2022 218 (H)  0 - 200 mg/dL Final    Triglycerides 01/14/2022 91  0 - 150 mg/dL Final    HDL Cholesterol 01/14/2022 66 (H)  40 - 60 mg/dL Final    LDL Cholesterol  01/14/2022 136 (H)  0 - 100 mg/dL Final    VLDL  Cholesterol 01/14/2022 16  5 - 40 mg/dL Final    LDL/HDL Ratio 01/14/2022 2.03   Final    25 Hydroxy, Vitamin D 01/14/2022 30.0  30.0 - 100.0 ng/ml Final       Assessment    ASSESSMENT/TREATMENT PLAN/INSTRUCTIONS/EDUCATION    (F41.1,  F41.0) Generalized anxiety disorder with panic attacks - Plan: busPIRone (BUSPAR) 10 MG tablet, ALPRAZolam (Xanax) 0.25 MG tablet    (Z56.6) Stress at work     -KEI/PANIC: Only mild improvement reported, treatment complicated by side effects of nausea and irritability that has subsided, patient has tried as needed Xanax a handful of times that was helpful but sedating, patient broke Xanax in half, no worsening depression reported, sleep is described as about the same, currently rates anxiety 6/10 over past two weeks. Treatment complicated by history of multiple meds in the past that were not tolerated or not effective, treatment also complicated by stress related to work, patient works for [employer].  It was agreed to increase BuSpar to 10 mg twice a day and for patient to follow-up with myself in 2 months, positive coping skills discussed, regular exercise discussed.    AFTER VISIT SUMMARY INSTRUCTIONS:    Medication changes:   BuSpar/buspirone increased to 10 mg tablet twice a day, give this a few weeks to see if you notice additional improvement in anxiety, if higher dose is not tolerated or effective can break tablets in half and go back to 5 mg twice a day.   Xanax 0.25 mg, 1 tablet daily as needed, okay to keep at home on hand for occasional use, do not take then drive a car or do something we can injure yourself due to risk of sedation.    Increase positive coping skills discussed today including time in nature, fall activities, time with friends/family, increase light/moderate exercise as tolerated, start off by setting small achievable goals, 30 minutes 3 to 4 days a week, baseline heart rate should increase 10-20 beats above normal, for exercise to be considered  moderate intensity, patient should begin to sweat within the first 10 to 12 minutes.      Please read attached handout on mindfulness stress reduction strategies and ways to be more physically active.    -Please call the office at [phone] with any worsening of symptoms or onset of intolerable side effects, please ask to leave a message with Ranjeet's medical assistant.  Please give my office up to 48 hours to respond to a patient call/question/refill request.  -Patient is agreeable to call 911 or go to the nearest ER should he/she/they have any thoughts of harm to self or others.  -Patient has been educated on providers schedule, contact information, and patient/provider expectations.   -Patient has been educated regarding multimodal approach with healthy nutrition, healthy sleep, regular physical activity, social activities, counseling, and medications.     Return in 2 months (on 12/13/2023) for KEI, Med Changes, Video visit.    PSYCHOTHERAPY:  In/Start Time: 0930.  Out/Stop Time: 0946.  16 minutes of face to face direct patient care with patient was spent for supportive psychotherapy including strengthening self awareness and insights, strengthening coping skills, counseling the patient regarding diagnoses, and utilizing cognitive behavioral therapy to assist the patient in recognizing more appropriate coping mechanisms which are proven effective in reducing the severity of frequency of symptoms.  This APRN assisted the patient in processing the patient's diagnoses, and also acknowledged and normalized the patient's thoughts, feelings, and concerns This APRN allowed the patient to freely discuss issues without interruption or judgment.      MEDICATION ISSUES:  JORGE: Reviewed 10/13/2023     Medications Discontinued During This Encounter   Medication Reason    buPROPion XL (WELLBUTRIN XL) 300 MG 24 hr tablet Historical Med - Therapy completed    busPIRone (BUSPAR) 5 MG tablet Reorder     New Medications Ordered  This Visit   Medications    busPIRone (BUSPAR) 10 MG tablet     Sig: Take 1 tablet by mouth 2 (Two) Times a Day.     Dispense:  60 tablet     Refill:  1    ALPRAZolam (Xanax) 0.25 MG tablet     Sig: Take 1 tablet by mouth Daily As Needed for Anxiety.     Dispense:  15 tablet     Refill:  1        No orders of the defined types were placed in this encounter.     -Barriers: history of multiple failed medications due to lack of efficacy and/or side effects  and stress  -Strengths:  humor and positive attitude    -Progress toward goal: Not at goal  -Functional Status: Moderate impairment   -Prognosis: Fair with Ongoing Treatment      SUMMARY/DISCUSSION:  Pt past social/medical/family history reviewed/updated. ROS conducted, medications/allergies, reviewed, patient was screened today for depression/anxiety, PHQ/KEI scores reviewed.  Most recent vitals/labs reviewed. Pt was given appropriate time to ask questions and concerns were addressed. A thorough discussion was had that included review of disease process, need for continued monitoring and additional treatment options including use of pharmacological and non-pharmacological approaches to care, decisions were made and agreed upon by patient and provider. Discussed the risks, benefits, and potential side effects of the medications; patient ackowledged and verbally consented.     Part of this note may be an electronic transcription/translation of spoken language to printed text using the Dragon Dictation System. Some of the data in this electronic note has been brought forward from a previous encounter, any necessary changes have been made, it has been reviewed by this APRN, and it is accurate.    This document has been electronically signed by RODGER Hein October 13, 2023 10:10 EDT

## 2023-10-13 NOTE — PATIENT INSTRUCTIONS
Medication changes:   BuSpar/buspirone increased to 10 mg tablet twice a day, give this a few weeks to see if you notice additional improvement in anxiety, if higher dose is not tolerated or effective can break tablets in half and go back to 5 mg twice a day.   Xanax 0.25 mg, 1 tablet daily as needed, okay to keep at home on hand for occasional use, do not take then drive a car or do something we can injure yourself due to risk of sedation,  Increase positive coping skills discussed today including time in nature, fall activities, time with friends/family, increase light/moderate exercise as tolerated, start off by setting small achievable goals, 30 minutes 3 to 4 days a week, baseline heart rate should increase 10-20 beats above normal, for exercise to be considered moderate intensity, patient should begin to sweat within the first 10 to 12 minutes.    Please read attached handout on mindfulness stress reduction strategies and ways to be more physically active.    GENERAL NEW PATIENT INSTRUCTIONS:  -The best way to get a hold of Ranjeet is to call the office at [phone] and ask to leave a message with his medical assistant.  Patient's are not able to message their mental health provider directly via [employer], please give my office up to 48 hours to respond to a patient call/question/refill request.  -Please call 911 or go to the nearest ER if you begin to have thoughts of harming yourself or other people.  -Refill requests will be made during normal office hours, Monday-Friday 8:00-5:30.  Ranjeet is out of the office on Wednesdays and weekends.  Follow-up appointments must be maintained in order for prescribing to continue.    -Tuesdays and Thursdays are Ranjeet's in-office days, Mondays and Fridays are his telehealth days.  The decision to be seen virtually or in person is up to the discretion of the provider, not all behavioral health problems are appropriate for telehealth.    NO SHOW POLICY:  We understand unexpected  circumstances arise; however, anytime you miss your appointment we are unable to provide you appropriate care.  In addition, each appointment missed could have been used to provide care for others.  We ask that you call at least 24 hours in advance to cancel or reschedule an appointment. We would like to take this opportunity to remind you of our policy stating patients who miss THREE or more appointments without cancelling or rescheduling 24 hours in advance of the appointment may be subject to cancellation of any further visits with our clinic and recommendation to seek in-person services/visits. Please call [phone] to reschedule your appointment. If there are reasons that make it difficult for you to keep the appointments, please call and let us know how we can help. Please understand that medication prescribing will not continue without seeing your provider.       [facility]   [address] [place] [id number]  P: [phone]  F: [phone]

## 2023-10-21 DIAGNOSIS — I10 ESSENTIAL HYPERTENSION: ICD-10-CM

## 2023-10-23 RX ORDER — AMLODIPINE BESYLATE 5 MG/1
5 TABLET ORAL DAILY
Qty: 90 TABLET | Refills: 0 | Status: SHIPPED | OUTPATIENT
Start: 2023-10-23

## 2023-10-26 ENCOUNTER — TELEPHONE (OUTPATIENT)
Dept: BEHAVIORAL HEALTH | Facility: CLINIC | Age: 48
End: 2023-10-26
Payer: COMMERCIAL

## 2023-10-26 NOTE — TELEPHONE ENCOUNTER
LVM for patient to return call to reschedule appointment for 12/11/23 with RODGER Hein, due to provider being out of the office that day.

## 2023-11-05 RX ORDER — BUPROPION HYDROCHLORIDE 150 MG/1
TABLET ORAL
Qty: 90 TABLET | Refills: 1 | Status: SHIPPED | OUTPATIENT
Start: 2023-11-05

## 2023-11-29 DIAGNOSIS — F41.0 GENERALIZED ANXIETY DISORDER WITH PANIC ATTACKS: ICD-10-CM

## 2023-11-29 DIAGNOSIS — F41.1 GENERALIZED ANXIETY DISORDER WITH PANIC ATTACKS: ICD-10-CM

## 2023-11-30 RX ORDER — BUSPIRONE HYDROCHLORIDE 5 MG/1
10 TABLET ORAL 2 TIMES DAILY
Qty: 60 TABLET | Refills: 0 | OUTPATIENT
Start: 2023-11-30

## 2023-12-15 ENCOUNTER — TELEMEDICINE (OUTPATIENT)
Dept: BEHAVIORAL HEALTH | Facility: CLINIC | Age: 48
End: 2023-12-15
Payer: COMMERCIAL

## 2023-12-15 DIAGNOSIS — F41.1 GAD (GENERALIZED ANXIETY DISORDER): Primary | ICD-10-CM

## 2023-12-15 DIAGNOSIS — Z56.6 STRESS AT WORK: ICD-10-CM

## 2023-12-15 DIAGNOSIS — F40.10 SOCIAL ANXIETY DISORDER: ICD-10-CM

## 2023-12-15 DIAGNOSIS — F33.0 MILD EPISODE OF RECURRENT MAJOR DEPRESSIVE DISORDER: ICD-10-CM

## 2023-12-15 DIAGNOSIS — N95.1 MENOPAUSAL STATE: ICD-10-CM

## 2023-12-15 SDOH — HEALTH STABILITY - MENTAL HEALTH: OTHER PHYSICAL AND MENTAL STRAIN RELATED TO WORK: Z56.6

## 2023-12-15 NOTE — PATIENT INSTRUCTIONS
Medication changes:   Trintellix/vortioxetine 5 mg once a day with breakfast, this is an extra low-dose, if tolerated but only partial effectiveness consider increasing to 10 mg.  Please read attached handout on new medication and about counseling.  Discontinue BuSpar due to side effects  Xanax 0.25 mg, patient taking 1/2 tablet roughly 2 to 3 days a week for acute anxiety.  [email or web address] due to history of poor response to medications  Cognitive behavioral therapy/counseling recommended, patient given list of local resources  Increase light/moderate exercise as tolerated, 2 to 3 days a week, strength training and cardiovascular together has been shown to be most beneficial, consistency is key.  Baseline heart rate should increase by 10-20 beats within the first 10 minutes in order for exercise to be considered moderate intensity OR patient should begin to sweat within the first 10 minutes for exercise to be considered moderate intensity.    [facility]  [address], [address]  [place] [address]  Phone: [phone]  [email or web address]    [facility] [place] [address], [place] [address] [place] [id number]   Phone: [phone]  [email or web address]    [facility]  [address] [place] [address]  [phone]  [email or web address]    [facility]  [address]  [place] [address]  [phone]  [email or web address]    [facility]  [address] in [employer].  We occupy [address].  [phone]  [email or web address]

## 2023-12-15 NOTE — PROGRESS NOTES
Patient assessed today via MyChart through [facility] pt is at home in a secure environment and verbalizes privacy during interview. JCARLOS Pollack is at home in a secure environment using a secure laptop.The patient's condition being diagnosed/treated is appropriate for telemedicine.The provider identified himself as well as his credentials.The patient, and/or patient's guardian, consent to be seen remotely, and when consent is given they understand that the consent allows for patient identifiable information to be sent to a third party as needed. They may refuse to be seen remotely at any time. The electronic data is encrypted and password protected, and the patient and/or guardian has been advised of the potential risks to privacy not withstanding such measures.    [facility] PROGRESS NOTE  [facility]  [address], [place] [address]    NAME: Alla Hinson     : 1975   MRN: [id number]     DATE: 12/15/2023    ALLERGY:Patient has no known allergies.     MEDICATIONS:  Current Outpatient Medications   Medication Instructions    ALLERGY SERUM INJECTION Subcutaneous, Once    ALPRAZolam (XANAX) 0.25 mg, Oral, Daily PRN    amLODIPine (NORVASC) 5 mg, Oral, Daily    atenolol (TENORMIN) 25 mg, Oral, Daily    cetirizine (ZYRTEC) 10 mg, Oral, Daily    diphenhydrAMINE (BENADRYL) 25 mg capsule 1 tablet, Oral, Nightly    fluticasone (FLONASE) 50 MCG/ACT nasal spray 1 spray, Nasal, Daily, PRN ONLY    ibuprofen (ADVIL,MOTRIN) 400 mg, Oral, Daily PRN    levonorgestrel (MIRENA) 20 MCG/24HR IUD 1 each, Intrauterine, Once    Vortioxetine HBr (TRINTELLIX) 5 mg, Oral, Daily With Breakfast     VITALS: There were no vitals taken for this visit. No LMP recorded. Patient has had an implant.     Subjective     Chief Complaint   Patient presents with    Anxiety    Depression    menopausal S/S      HPI:  48 y.o. female patient seen for follow up.  Patient seen last roughly 2 months ago at that time it was agreed  "to increase BuSpar to 10 mg twice a day, since then patient reports medication is just not for her, did not like how it made her feel, on her own reduce the dose to 5 mg twice a day and then has been on 2.5 mg twice a day X 2 weeks, patient has history of multiple failed meds due to lack of benefit or side effects which would point to a metabolic/genetic component, personality disorder but also likely contributed to menopause, patient endorses mood swings/irritability/hot flashes, anxiety almost always exclusively occurs at work due to performance, occurs at home as well.  Patient reports she is using one half of the Xanax roughly 2 to 3 days a week, patient has been ambivalent/dragging her feet on my prior recommendations for counseling/therapy, patient has not increased exercise as recommended prior as well.  [employer] psychotropic panel discussed today.     Towards end of interview patient reported she has heard from friends about newer drug Trintellix and was interested in trying this out, agreed to give her an extra low dose but I also believe counseling/exercise/[employer] testing would be beneficial.    Prior psychiatric medications:  Wellbutrin: Side effects at 300 mg  Effexor, was on for several years, benefit reported in the beginning  Zoloft, dose/duration unknown  Lexapro, dose/duration unknown  BuSpar 5 mg, patient took for 1 day and felt jittery, then restarted 5 mg bid that was somewhat effective then 10 mg was \"too much\"  Hydroxyzine, not effective/sedation      SUBSTANCE/SEXUAL HISTORY:  Social History     Socioeconomic History    Marital status:      Spouse name: Ronaldo    Number of children: 2    Highest education level: Bachelor's degree (e.g., BA, AB, BS)   Tobacco Use    Smoking status: Former     Packs/day: 0.50     Years: 3.00     Additional pack years: 0.00     Total pack years: 1.50     Types: Cigarettes    Smokeless tobacco: Never    Tobacco comments:     College years   Substance and " Sexual Activity    Alcohol use: Yes     Alcohol/week: 7.0 standard drinks of alcohol     Types: 4 Glasses of wine, 3 Cans of beer per week     Comment: 1 or less per day of wine or beer    Drug use: Never    Sexual activity: Yes     Partners: Male     Birth control/protection: I.U.D.      FAMILY HISTORY:  family history includes Hypertension in her father; No Known Problems in her mother.     PAST MEDICAL HISTORY   has a past medical history of Allergic, Anxiety, Depression, Depression with anxiety (06/21/2022), Hypertension, and Ovarian cyst.    She has no past medical history of Alcohol abuse, Alcoholism, Anorexia nervosa, Autism spectrum disorder, Bipolar disorder, Borderline personality disorder, Bulimia nervosa, Disease of thyroid gland, Head injury, HIV disease, Liver disease, Obsessive-compulsive disorder, Oppositional defiant disorder, Panic disorder, Peripheral neuropathy, Psychosis, PTSD (post-traumatic stress disorder), Seizures, Self-injurious behavior, Substance abuse, Suicide attempt, or Violence, history of.     Review of Systems   Constitutional:  Positive for diaphoresis and fatigue. Negative for fever.        Hot flashes   Respiratory:  Negative for chest tightness and shortness of breath.    Cardiovascular:  Negative for chest pain.   Gastrointestinal:  Positive for nausea.   Neurological:  Positive for memory problem. Negative for dizziness and light-headedness.   Psychiatric/Behavioral:  Positive for decreased concentration and stress. Negative for sleep disturbance and suicidal ideas. The patient is nervous/anxious.      Objective   Physical Exam  Constitutional:       Appearance: Normal appearance.   HENT:      Head: Normocephalic.   Pulmonary:      Effort: Pulmonary effort is normal.   Skin:     General: Skin is dry.   Neurological:      General: No focal deficit present.      Mental Status: She is alert and oriented to person, place, and time.   Psychiatric:         Mood and Affect: Mood  normal.         Behavior: Behavior normal.         Thought Content: Thought content normal.       PHQ-9 Depression Screening  Little interest or pleasure in doing things? (P) 1-->several days   Feeling down, depressed, or hopeless? (P) 1-->several days   Trouble falling or staying asleep, or sleeping too much? (P) 1-->several days   Feeling tired or having little energy?     Poor appetite or overeating? (P) 1-->several days   Feeling bad about yourself/you are a failure/have let yourself or your family down? (P) 1-->several days   Trouble concentrating on things? (P) 2-->more than half the days   Psychomotor agitation/retardation (P) 1-->several days   Thoughts about death/dying/suicide (P) 0-->not at all   PHQ-9 Total Score (P) 10   How difficult have these problems for you? (P) somewhat difficult     GAD7 Documentation:  Feeling nervous, anxious or on edge (P) 2   Not being able to stop or control worrying (P) 1   Worrying too much about different things (P) 2   Trouble relaxing (P) 2   Being so restless that it is hard to sit still (P) 1   Becoming easily annoyed or irritable (P) 2   Feeling Afraid as if something awful might happen (P) 0   KEI Total Score (P) 10   How difficult have these problems made it for you? (P) Somewhat difficult     MENTAL STATUS EXAM   General Appearance:  Cleanly groomed and dressed  Eye Contact:  Fair  Attitude:  Cooperative  Speech:  Normal rate, tone, volume  Mood and affect:  Anxious  Thought Process:  Goal-directed  Associations/ Thought Content:  No delusions  Hallucinations:  None  Suicidal Ideations:  Not present  Homicidal Ideation:  Not present  Sensorium:  Alert  Orientation:  Person, place, time and situation  Attention Span/ Concentration:  Easily distracted  Fund of Knowledge:  Appropriate for age and educational level  Intellectual Functioning:  Above average  Insight:  Limited  Judgement:  Fair  Reliability:  Fair  Impulse Control:  Poor     LABS:    No visits with  results within 1 Month(s) from this visit.   Latest known visit with results is:   Office Visit on 01/14/2022   Component Date Value Ref Range Status    Glucose 01/14/2022 82  65 - 99 mg/dL Final    BUN 01/14/2022 12  6 - 20 mg/dL Final    Creatinine 01/14/2022 0.79  0.57 - 1.00 mg/dL Final    Sodium 01/14/2022 141  136 - 145 mmol/L Final    Potassium 01/14/2022 4.2  3.5 - 5.2 mmol/L Final    Chloride 01/14/2022 104  98 - 107 mmol/L Final    CO2 01/14/2022 26.5  22.0 - 29.0 mmol/L Final    Calcium 01/14/2022 9.5  8.6 - 10.5 mg/dL Final    Total Protein 01/14/2022 7.0  6.0 - 8.5 g/dL Final    Albumin 01/14/2022 4.70  3.50 - 5.20 g/dL Final    ALT (SGPT) 01/14/2022 33  1 - 33 U/L Final    AST (SGOT) 01/14/2022 25  1 - 32 U/L Final    Alkaline Phosphatase 01/14/2022 41  39 - 117 U/L Final    Total Bilirubin 01/14/2022 0.4  0.0 - 1.2 mg/dL Final    eGFR Non African Amer 01/14/2022 78  >60 mL/min/1.73 Final    Globulin 01/14/2022 2.3  gm/dL Final    A/G Ratio 01/14/2022 2.0  g/dL Final    BUN/Creatinine Ratio 01/14/2022 15.2  7.0 - 25.0 Final    Anion Gap 01/14/2022 10.5  5.0 - 15.0 mmol/L Final    TSH 01/14/2022 1.770  0.270 - 4.200 uIU/mL Final    Free T4 01/14/2022 1.22  0.93 - 1.70 ng/dL Final    Total Cholesterol 01/14/2022 218 (H)  0 - 200 mg/dL Final    Triglycerides 01/14/2022 91  0 - 150 mg/dL Final    HDL Cholesterol 01/14/2022 66 (H)  40 - 60 mg/dL Final    LDL Cholesterol  01/14/2022 136 (H)  0 - 100 mg/dL Final    VLDL Cholesterol 01/14/2022 16  5 - 40 mg/dL Final    LDL/HDL Ratio 01/14/2022 2.03   Final    25 Hydroxy, Vitamin D 01/14/2022 30.0  30.0 - 100.0 ng/ml Final     Assessment    ASSESSMENT/TREATMENT PLAN/INSTRUCTIONS/EDUCATION    (F41.1) KEI (generalized anxiety disorder) - Plan: Vortioxetine HBr (Trintellix) 5 MG tablet tablet    (F40.10) Social anxiety disorder - Plan: Vortioxetine HBr (Trintellix) 5 MG tablet tablet    (Z56.6) Stress at work - Plan: Vortioxetine HBr (Trintellix) 5 MG tablet  tablet    (F33.0) Mild episode of recurrent major depressive disorder - Plan: Vortioxetine HBr (Trintellix) 5 MG tablet tablet    (N95.1) Menopausal symptoms (focus/concentration/hot flashes) - Plan: Vortioxetine HBr (Trintellix) 5 MG tablet tablet     -ANXIETY: Worsening due to patient self titrating of medications due to perceived side effects, patient has been using Xanax 2-3 times a week 1/2 tablet.  Treatment complicated by history of multiple failed meds, patient ambivalence towards counseling and other positive coping skills that have been recommended prior including regular exercise.    -Symptoms occur almost exclusively at work but also at home, patient herself does not know how she feels at times, jumps from topic to topic often, reports better than worse depression and then better than worse anxiety depending on appointment, patient also going through menopause, has yet to reach out to PCP/OB/GYN about options.    AFTER VISIT SUMMARY INSTRUCTIONS:    Medication changes:   Trintellix/vortioxetine 5 mg once a day with breakfast, this is an extra low-dose, if tolerated but only partial effectiveness consider increasing to 10 mg.  Please read attached handout on new medication and about counseling.  Discontinue BuSpar due to side effects  Xanax 0.25 mg, patient taking 1/2 tablet roughly 2 to 3 days a week for acute anxiety.  [email or web address]/, due to history of poor response to medications  Cognitive behavioral therapy/counseling recommended, patient given list of local resources  Increase light/moderate exercise as tolerated, 2 to 3 days a week, strength training and cardiovascular together has been shown to be most beneficial, consistency is key.  Baseline heart rate should increase by 10-20 beats within the first 10 minutes in order for exercise to be considered moderate intensity OR patient should begin to sweat within the first 10 minutes for exercise to be considered moderate  intensity.    -Please call the office at [phone] with any worsening of symptoms or onset of intolerable side effects, please ask to leave a message with Ranjeet's medical assistant.  Please give my office up to 48 hours to respond to a patient call/question/refill request.  -Patient is agreeable to call 911 or go to the nearest ER should he/she/they have any thoughts of harm to self or others.  -Patient has been educated on providers schedule, contact information, and patient/provider expectations.   -Patient has been educated regarding multimodal approach with healthy nutrition, healthy sleep, regular physical activity, social activities, counseling, and medications.     Return in 5 weeks (on 1/19/2024) for Video visit.    MEDICATION ISSUES:  JORGE: Reviewed 12/15/2023     Medications Discontinued During This Encounter   Medication Reason    buPROPion XL (WELLBUTRIN XL) 150 MG 24 hr tablet Not Efficacious    busPIRone (BUSPAR) 10 MG tablet Side effects       New Medications Ordered This Visit   Medications    Vortioxetine HBr (Trintellix) 5 MG tablet tablet     Sig: Take 1 tablet by mouth Daily With Breakfast.     Dispense:  30 tablet     Refill:  0      No orders of the defined types were placed in this encounter.     -Barriers: history of multiple failed medications due to lack of efficacy and/or side effects , multiple psychiatric comorbidities , stress, and resistant/ambivalent to psychotherapy recommendations  -Strengths:  humor and positive attitude    -Progress toward goal: Not at goal  -Functional Status: Moderate impairment   -Prognosis: Fair with Ongoing Treatment      SUMMARY/DISCUSSION:  Pt past social/medical/family history reviewed/updated. ROS conducted, medications/allergies, reviewed, patient was screened today for depression/anxiety, PHQ/KEI scores reviewed.  Most recent vitals/labs reviewed. Pt was given appropriate time to ask questions and concerns were addressed. A thorough discussion was  had that included review of disease process, need for continued monitoring and additional treatment options including use of pharmacological and non-pharmacological approaches to care, decisions were made and agreed upon by patient and provider. Discussed the risks, benefits, and potential side effects of the medications; patient ackowledged and verbally consented.     Part of this note may be an electronic transcription/translation of spoken language to printed text using the Dragon Dictation System. Some of the data in this electronic note has been brought forward from a previous encounter, any necessary changes have been made, it has been reviewed by this APRN, and it is accurate.    This document has been electronically signed by RODGER Hein December 15, 2023 11:57 EST

## 2024-01-07 LAB
ALBUMIN SERPL-MCNC: 4.5 G/DL (ref 3.9–4.9)
ALBUMIN/GLOB SERPL: 2.1 {RATIO} (ref 1.2–2.2)
ALP SERPL-CCNC: 44 IU/L (ref 44–121)
ALT SERPL-CCNC: 16 IU/L (ref 0–32)
AST SERPL-CCNC: 17 IU/L (ref 0–40)
BASOPHILS # BLD AUTO: 0.1 X10E3/UL (ref 0–0.2)
BASOPHILS NFR BLD AUTO: 1 %
BILIRUB SERPL-MCNC: 0.5 MG/DL (ref 0–1.2)
BUN SERPL-MCNC: 17 MG/DL (ref 6–24)
BUN/CREAT SERPL: 18 (ref 9–23)
CALCIUM SERPL-MCNC: 9.3 MG/DL (ref 8.7–10.2)
CHLORIDE SERPL-SCNC: 105 MMOL/L (ref 96–106)
CHOLEST SERPL-MCNC: 219 MG/DL (ref 100–199)
CO2 SERPL-SCNC: 22 MMOL/L (ref 20–29)
CREAT SERPL-MCNC: 0.92 MG/DL (ref 0.57–1)
EGFRCR SERPLBLD CKD-EPI 2021: 76 ML/MIN/1.73
EOSINOPHIL # BLD AUTO: 0.2 X10E3/UL (ref 0–0.4)
EOSINOPHIL NFR BLD AUTO: 5 %
ERYTHROCYTE [DISTWIDTH] IN BLOOD BY AUTOMATED COUNT: 12.5 % (ref 11.7–15.4)
GLOBULIN SER CALC-MCNC: 2.1 G/DL (ref 1.5–4.5)
GLUCOSE SERPL-MCNC: 85 MG/DL (ref 70–99)
HCT VFR BLD AUTO: 39.6 % (ref 34–46.6)
HDLC SERPL-MCNC: 62 MG/DL
HGB BLD-MCNC: 13.4 G/DL (ref 11.1–15.9)
IMM GRANULOCYTES # BLD AUTO: 0 X10E3/UL (ref 0–0.1)
IMM GRANULOCYTES NFR BLD AUTO: 0 %
LDLC SERPL CALC-MCNC: 143 MG/DL (ref 0–99)
LYMPHOCYTES # BLD AUTO: 1.2 X10E3/UL (ref 0.7–3.1)
LYMPHOCYTES NFR BLD AUTO: 27 %
MCH RBC QN AUTO: 30.2 PG (ref 26.6–33)
MCHC RBC AUTO-ENTMCNC: 33.8 G/DL (ref 31.5–35.7)
MCV RBC AUTO: 89 FL (ref 79–97)
MONOCYTES # BLD AUTO: 0.4 X10E3/UL (ref 0.1–0.9)
MONOCYTES NFR BLD AUTO: 8 %
NEUTROPHILS # BLD AUTO: 2.7 X10E3/UL (ref 1.4–7)
NEUTROPHILS NFR BLD AUTO: 59 %
PLATELET # BLD AUTO: 348 X10E3/UL (ref 150–450)
POTASSIUM SERPL-SCNC: 4 MMOL/L (ref 3.5–5.2)
PROT SERPL-MCNC: 6.6 G/DL (ref 6–8.5)
RBC # BLD AUTO: 4.43 X10E6/UL (ref 3.77–5.28)
SODIUM SERPL-SCNC: 140 MMOL/L (ref 134–144)
TRIGL SERPL-MCNC: 81 MG/DL (ref 0–149)
VLDLC SERPL CALC-MCNC: 14 MG/DL (ref 5–40)
WBC # BLD AUTO: 4.6 X10E3/UL (ref 3.4–10.8)

## 2024-02-16 ENCOUNTER — OFFICE VISIT (OUTPATIENT)
Dept: OBSTETRICS AND GYNECOLOGY | Facility: CLINIC | Age: 49
End: 2024-02-16
Payer: COMMERCIAL

## 2024-02-16 ENCOUNTER — PROCEDURE VISIT (OUTPATIENT)
Dept: OBSTETRICS AND GYNECOLOGY | Facility: CLINIC | Age: 49
End: 2024-02-16
Payer: COMMERCIAL

## 2024-02-16 VITALS
BODY MASS INDEX: 26.37 KG/M2 | SYSTOLIC BLOOD PRESSURE: 150 MMHG | HEART RATE: 85 BPM | HEIGHT: 67 IN | WEIGHT: 168 LBS | DIASTOLIC BLOOD PRESSURE: 80 MMHG

## 2024-02-16 DIAGNOSIS — Z12.11 COLON CANCER SCREENING: Primary | ICD-10-CM

## 2024-02-16 DIAGNOSIS — Z12.31 VISIT FOR SCREENING MAMMOGRAM: Primary | ICD-10-CM

## 2024-02-16 DIAGNOSIS — R14.0 ABDOMINAL BLOATING: ICD-10-CM

## 2024-02-16 DIAGNOSIS — Z00.00 ANNUAL PHYSICAL EXAM: ICD-10-CM

## 2024-02-16 RX ORDER — ACETAMINOPHEN AND CODEINE PHOSPHATE 120; 12 MG/5ML; MG/5ML
1 SOLUTION ORAL DAILY
Qty: 28 TABLET | Refills: 12 | Status: SHIPPED | OUTPATIENT
Start: 2024-02-16 | End: 2025-02-15

## 2024-02-20 ENCOUNTER — PATIENT MESSAGE (OUTPATIENT)
Dept: OBSTETRICS AND GYNECOLOGY | Facility: CLINIC | Age: 49
End: 2024-02-20
Payer: COMMERCIAL

## 2024-02-20 ENCOUNTER — PATIENT ROUNDING (BHMG ONLY) (OUTPATIENT)
Dept: OBSTETRICS AND GYNECOLOGY | Facility: CLINIC | Age: 49
End: 2024-02-20
Payer: COMMERCIAL

## 2024-02-20 NOTE — PROGRESS NOTES
My chart message has been sent to the patient for [place] [facility]

## 2024-02-21 DIAGNOSIS — I10 ESSENTIAL HYPERTENSION: ICD-10-CM

## 2024-02-21 LAB
CYTOLOGIST CVX/VAG CYTO: NORMAL
CYTOLOGY CVX/VAG DOC CYTO: NORMAL
CYTOLOGY CVX/VAG DOC THIN PREP: NORMAL
DX ICD CODE: NORMAL
HIV 1 & 2 AB SER-IMP: NORMAL
HPV GENOTYPE REFLEX: NORMAL
HPV I/H RISK 4 DNA CVX QL PROBE+SIG AMP: NEGATIVE
Lab: NORMAL
OTHER STN SPEC: NORMAL
STAT OF ADQ CVX/VAG CYTO-IMP: NORMAL

## 2024-02-23 RX ORDER — AMLODIPINE BESYLATE 5 MG/1
5 TABLET ORAL DAILY
Qty: 30 TABLET | Refills: 0 | Status: SHIPPED | OUTPATIENT
Start: 2024-02-23

## 2024-02-23 NOTE — TELEPHONE ENCOUNTER
Rx Refill Note  Requested Prescriptions     Pending Prescriptions Disp Refills    amLODIPine (NORVASC) 5 MG tablet [Pharmacy Med Name: amLODIPine BESYLATE 5 MG TAB] 30 tablet 0     Sig: TAKE 1 TABLET BY MOUTH DAILY      Last office visit with prescribing clinician: 4/28/2023   Last telemedicine visit with prescribing clinician: Visit date not found   Next office visit with prescribing clinician: Visit date not found                         Would you like a call back once the refill request has been completed: [] Yes [] No    If the office needs to give you a call back, can they leave a voicemail: [] Yes [] No    Loan Becerril MA  02/23/24, 16:54 EST

## 2024-02-28 DIAGNOSIS — F41.1 GENERALIZED ANXIETY DISORDER WITH PANIC ATTACKS: ICD-10-CM

## 2024-02-28 DIAGNOSIS — F41.0 GENERALIZED ANXIETY DISORDER WITH PANIC ATTACKS: ICD-10-CM

## 2024-02-28 RX ORDER — ALPRAZOLAM 0.25 MG/1
0.25 TABLET ORAL DAILY PRN
Qty: 15 TABLET | Refills: 0 | OUTPATIENT
Start: 2024-02-28

## 2024-02-28 NOTE — TELEPHONE ENCOUNTER
LAST REFILL - 10/13/23  LAST VISIT - 12/15/23  NEXT VISIT - not scheduled, was due back in January 2024

## 2024-03-02 DIAGNOSIS — F41.1 GENERALIZED ANXIETY DISORDER WITH PANIC ATTACKS: ICD-10-CM

## 2024-03-02 DIAGNOSIS — F41.0 GENERALIZED ANXIETY DISORDER WITH PANIC ATTACKS: ICD-10-CM

## 2024-03-03 RX ORDER — ALPRAZOLAM 0.25 MG/1
0.25 TABLET ORAL DAILY PRN
Qty: 15 TABLET | Refills: 1 | OUTPATIENT
Start: 2024-03-03

## 2024-03-04 ENCOUNTER — TELEPHONE (OUTPATIENT)
Dept: FAMILY MEDICINE CLINIC | Facility: CLINIC | Age: 49
End: 2024-03-04
Payer: COMMERCIAL

## 2024-03-04 NOTE — TELEPHONE ENCOUNTER
Patient called requesting to schedule a video visit with Ranjeet. Please return call to [phone]

## 2024-03-07 NOTE — PROGRESS NOTES
Patient assessed today via MyChart through EPIC pt is at home in a secure environment and verbalizes privacy during interview. JCARLOS Pollack is at home in a secure environment using a secure laptop.The patient's condition being diagnosed/treated is appropriate for telemedicine.The provider identified himself as well as his credentials.The patient, and/or patient's guardian, consent to be seen remotely, and when consent is given they understand that the consent allows for patient identifiable information to be sent to a third party as needed. They may refuse to be seen remotely at any time. The electronic data is encrypted and password protected, and the patient and/or guardian has been advised of the potential risks to privacy not withstanding such measures.    [facility] PROGRESS NOTE  [facility]  [address], [place] [address]    NAME: Alla Hinson     : 1975   MRN: [id number]     DATE: 2024    ALLERGY:Patient has no known allergies.     MEDICATIONS:  Current Outpatient Medications   Medication Instructions    ALLERGY SERUM INJECTION Subcutaneous, Once    ALPRAZolam (XANAX) 0.25 mg, Oral, Daily PRN    amLODIPine (NORVASC) 5 mg, Oral, Daily    atenolol (TENORMIN) 25 mg, Oral, Daily    cetirizine (ZYRTEC) 10 mg, Oral, Daily    diphenhydrAMINE (BENADRYL) 25 mg capsule 1 tablet, Oral, Nightly    fluticasone (FLONASE) 50 MCG/ACT nasal spray 1 spray, Nasal, Daily, PRN ONLY    ibuprofen (ADVIL,MOTRIN) 400 mg, Oral, Daily PRN    norethindrone (MICRONOR) 0.35 mg, Oral, Daily     VITALS: There were no vitals taken for this visit. Patient's last menstrual period was 2024 (approximate).     Subjective     Chief Complaint   Patient presents with    Anxiety    Depression    menopausal S/S      HPI:  48 y.o. female patient seen for follow up.  Patient seen last back in December, did not follow-up as planned, Trintellix was ordered per patient request after hearing about it from  "a friend, patient reports it was too expensive, I suggested taking alternative medication Viibryd, patient reports she did not follow-up on it, patient has increased Xanax use tied to stress at work and at home reports she is taken on additional projects and her 18-year-old daughter will be moving away to college in [facility], counseling has been recommended prior, exercise has been recommended prior, patient did agree to [employer] psychotropic testing today as well as referral to [facility] for counseling, patient has been educated extensively on my stance of long-term daily use of benzos, I would not continue this long-term, I will not prescribe this for daily use, patient also following up with GYN for menopausal symptoms including hot flashes and brain fog, patient does not meet ADHD criteria.    Prior psychiatric medications:  Wellbutrin: Side effects at 300 mg  Effexor, was on for several years, benefit reported in the beginning  Zoloft, dose/duration unknown  Lexapro, dose/duration unknown  BuSpar 5 mg, patient took for 1 day and felt jittery, then restarted 5 mg bid that was somewhat effective then 10 mg was \"too much\"  Hydroxyzine, not effective/sedation      SUBSTANCE/SEXUAL HISTORY:  Social History     Socioeconomic History    Marital status:      Spouse name: Ronaldo    Number of children: 2    Highest education level: Bachelor's degree (e.g., BA, AB, BS)   Tobacco Use    Smoking status: Former     Current packs/day: 0.50     Average packs/day: 0.5 packs/day for 3.0 years (1.5 ttl pk-yrs)     Types: Cigarettes    Smokeless tobacco: Never    Tobacco comments:     College years   Substance and Sexual Activity    Alcohol use: Yes     Alcohol/week: 7.0 standard drinks of alcohol     Types: 4 Glasses of wine, 3 Cans of beer per week     Comment: 1 or less per day of wine or beer    Drug use: Never    Sexual activity: Yes     Partners: Male     Birth control/protection: I.U.D.    "   FAMILY HISTORY:  family history includes Hypertension in her father; No Known Problems in her mother.     PAST MEDICAL HISTORY   has a past medical history of Allergic, Anxiety, Depression, Depression with anxiety (06/21/2022), Hypertension, and Ovarian cyst.    She has no past medical history of Alcohol abuse, Alcoholism, Anorexia nervosa, Autism spectrum disorder, Bipolar disorder, Borderline personality disorder, Bulimia nervosa, Disease of thyroid gland, Head injury, HIV disease, Liver disease, Obsessive-compulsive disorder, Oppositional defiant disorder, Panic disorder, Peripheral neuropathy, Psychosis, PTSD (post-traumatic stress disorder), Seizures, Self-injurious behavior, Substance abuse, Suicide attempt, or Violence, history of.     Review of Systems   Constitutional:  Positive for diaphoresis and fatigue. Negative for fever.        Hot flashes   Respiratory:  Negative for chest tightness and shortness of breath.    Cardiovascular:  Negative for chest pain.   Gastrointestinal:  Positive for nausea.   Neurological:  Positive for memory problem. Negative for dizziness and light-headedness.   Psychiatric/Behavioral:  Positive for decreased concentration and stress. Negative for sleep disturbance and suicidal ideas. The patient is nervous/anxious.      Objective   PHYSICAL EXAM:  Constitutional:       Appearance: Normal appearance.   HENT:      Head: Normocephalic.   Pulmonary:      Effort: Pulmonary effort is normal.   Skin:     General: Skin is dry.   Neurological:      Mental Status: He/She/They are alert and oriented to person, place, and time.     PHQ-9 Depression Screening  Little interest or pleasure in doing things? (P) 1-->several days   Feeling down, depressed, or hopeless? (P) 1-->several days   Trouble falling or staying asleep, or sleeping too much? (P) 2-->more than half the days   Feeling tired or having little energy?     Poor appetite or overeating? (P) 1-->several days   Feeling bad about  yourself/you are a failure/have let yourself or your family down? (P) 1-->several days   Trouble concentrating on things? (P) 2-->more than half the days   Psychomotor agitation/retardation (P) 1-->several days   Thoughts about death/dying/suicide (P) 0-->not at all   PHQ-9 Total Score (P) 11   How difficult have these problems for you? (P) somewhat difficult     GAD7 Documentation:  Feeling nervous, anxious or on edge (P) 2   Not being able to stop or control worrying (P) 2   Worrying too much about different things (P) 2   Trouble relaxing (P) 2   Being so restless that it is hard to sit still (P) 2   Becoming easily annoyed or irritable (P) 2   Feeling Afraid as if something awful might happen (P) 0   KEI Total Score (P) 12   How difficult have these problems made it for you? (P) Somewhat difficult     MENTAL STATUS EXAM   General Appearance:  Cleanly groomed and dressed  Eye Contact:  Fair  Attitude:  Cooperative  Speech:  Normal rate, tone, volume  Mood and affect:  Anxious  Thought Process:  Goal-directed  Associations/ Thought Content:  No delusions  Hallucinations:  None  Suicidal Ideations:  Not present  Homicidal Ideation:  Not present  Sensorium:  Alert  Orientation:  Person, place, time and situation  Attention Span/ Concentration:  Easily distracted  Fund of Knowledge:  Appropriate for age and educational level  Intellectual Functioning:  Above average  Insight:  Limited  Judgement:  Fair  Reliability:  Fair  Impulse Control:  Poor     LABS:  CBC          1/6/2024    08:12   CBC   WBC 4.6    RBC 4.43    Hemoglobin 13.4    Hematocrit 39.6    MCV 89    MCH 30.2    MCHC 33.8    RDW 12.5    Platelets 348      Office Visit on 02/16/2024   Component Date Value Ref Range Status    Diagnosis 02/16/2024 Comment   Final    Comment: NEGATIVE FOR INTRAEPITHELIAL LESION OR MALIGNANCY.  THIS SPECIMEN WAS RESCREENED AS PART OF OUR  PROGRAM.      Specimen adequacy: 02/16/2024 Comment   Final    Comment:  Satisfactory for evaluation.  Endocervical and/or squamous metaplastic  cells (endocervical component) are present.      Clinician Provided ICD-10: 02/16/2024 Comment   Final    Z00.00    Performed by: 02/16/2024 Comment   Final    Silvina Vitale, Cytotechnologist ([facility])    QC reviewed by: 02/16/2024 Comment   Final    Franchesca Mason, Cytotechnologist ([facility])    . 02/16/2024 .   Final    Note: 02/16/2024 Comment   Final    Comment: The Pap smear is a screening test designed to aid in the detection of  premalignant and malignant conditions of the uterine cervix.  It is not a  diagnostic procedure and should not be used as the sole means of detecting  cervical cancer.  Both false-positive and false-negative reports do occur.      Method: 02/16/2024 Comment   Final    Comment: This liquid based ThinPrep(R) pap test was screened with the  use of an image guided system.      HPV Aptima 02/16/2024 Negative  Negative Final    Comment: This nucleic acid amplification test detects fourteen high-risk  HPV types (16,18,31,33,35,39,45,51,52,56,58,59,66,68) without  differentiation.      HPV Genotype Reflex 02/16/2024 Comment   Final    Criteria not met, HPV Genotype not performed.     Assessment    ASSESSMENT/TREATMENT PLAN/INSTRUCTIONS/EDUCATION    (F41.1,  F41.0) Generalized anxiety disorder with panic attacks - Plan: Ambulatory Referral to Behavioral Health, ALPRAZolam (Xanax) 0.25 MG tablet    (T88.7XXA) Side effect of medication    (Z56.6) Stress at work - Plan: Ambulatory Referral to Behavioral Health, ALPRAZolam (Xanax) 0.25 MG tablet    (Z79.899) High risk medication use - Plan: ALPRAZolam (Xanax) 0.25 MG tablet     Anxiety/panic worsening on/off, tied to stress at home and at work, complicated by history of side effects to meds even when given at low doses and with careful titration  High risk medication use, Chinmay reviewed/appropriate  Xanax will refill for the time being, patient advised on my stance of  long-term use of benzos, takes 1/2 tablet 3 to 4 days a week  [employer] testing recommended, kit sent to patient home, order placed  [facility] to [facility]  F/U tentatively in 6 weeks, will reach out to patient to review results of GeneSight testing    -Please call the office at [phone] with any worsening of symptoms or onset of intolerable side effects, please ask to leave a message with Ranjeet's medical assistant.  Please give my office up to 48 hours to respond to a patient call/question/refill request.  -Patient is agreeable to call 911 or go to the nearest ER should he/she/they have any thoughts of harm to self or others.  -Patient has been educated on providers schedule, contact information, and patient/provider expectations.   -Patient has been educated regarding multimodal approach with healthy nutrition, healthy sleep, regular physical activity, social activities, counseling, and medications.     Return in about 6 weeks (around 4/19/2024) for Video visit.    MEDICATION ISSUES:  JORGE: Reviewed 03/08/2024     Medications Discontinued During This Encounter   Medication Reason    Vortioxetine HBr (Trintellix) 5 MG tablet tablet Cost of medication    ALPRAZolam (Xanax) 0.25 MG tablet Reorder         New Medications Ordered This Visit   Medications    ALPRAZolam (Xanax) 0.25 MG tablet     Sig: Take 1 tablet by mouth Daily As Needed for Anxiety for up to 30 days.     Dispense:  15 tablet     Refill:  1        Orders Placed This Encounter   Procedures    Ambulatory Referral to Behavioral Health     Referral Priority:   Routine     Referral Type:   Behavorial Health/Psych     Referral Reason:   Specialty Services Required     Requested Specialty:   Behavioral Health     Number of Visits Requested:   1      -Barriers: history of multiple failed medications due to lack of efficacy and/or side effects , multiple psychiatric comorbidities , stress, and resistant/ambivalent to psychotherapy  recommendations  -Strengths:  humor and positive attitude    -Progress toward goal: Not at goal  -Functional Status: Moderate impairment   -Prognosis: Guarded with Ongoing Treatment     SUMMARY/DISCUSSION:  Pt past social/medical/family history reviewed/updated. ROS conducted, medications/allergies, reviewed, patient was screened today for depression/anxiety, PHQ/KEI scores reviewed.  Most recent vitals/labs reviewed. Pt was given appropriate time to ask questions and concerns were addressed. A thorough discussion was had that included review of disease process, need for continued monitoring and additional treatment options including use of pharmacological and non-pharmacological approaches to care, decisions were made and agreed upon by patient and provider. Discussed the risks, benefits, and potential side effects of the medications; patient ackowledged and verbally consented.     Part of this note may be an electronic transcription/translation of spoken language to printed text using the Dragon Dictation System. Some of the data in this electronic note has been brought forward from a previous encounter, any necessary changes have been made, it has been reviewed by this APRN, and it is accurate.    This document has been electronically signed by RODGER Hein March 8, 2024 10:06 EST

## 2024-03-08 ENCOUNTER — TELEMEDICINE (OUTPATIENT)
Dept: BEHAVIORAL HEALTH | Facility: CLINIC | Age: 49
End: 2024-03-08
Payer: COMMERCIAL

## 2024-03-08 DIAGNOSIS — Z56.6 STRESS AT WORK: ICD-10-CM

## 2024-03-08 DIAGNOSIS — F41.0 GENERALIZED ANXIETY DISORDER WITH PANIC ATTACKS: Primary | Chronic | ICD-10-CM

## 2024-03-08 DIAGNOSIS — T88.7XXA SIDE EFFECT OF MEDICATION: ICD-10-CM

## 2024-03-08 DIAGNOSIS — F41.1 GENERALIZED ANXIETY DISORDER WITH PANIC ATTACKS: Primary | Chronic | ICD-10-CM

## 2024-03-08 DIAGNOSIS — Z79.899 HIGH RISK MEDICATION USE: ICD-10-CM

## 2024-03-08 RX ORDER — ALPRAZOLAM 0.25 MG/1
0.25 TABLET ORAL DAILY PRN
Qty: 15 TABLET | Refills: 1 | Status: SHIPPED | OUTPATIENT
Start: 2024-03-08 | End: 2024-04-07

## 2024-03-08 SDOH — HEALTH STABILITY - MENTAL HEALTH: OTHER PHYSICAL AND MENTAL STRAIN RELATED TO WORK: Z56.6

## 2024-03-08 NOTE — PATIENT INSTRUCTIONS
GENERAL NEW PATIENT INSTRUCTIONS:    -Please arrive in person or virtually 10-15 minutes prior to appointment to allow for registration/sign in/questionnaires. If you are seen virtually please review after visit summary (AVS)/patient instructions via [email or web address] for a summary of plan of care/changes in treatment plan. If you are having difficulties logging on or accessing [email or web address] please contact my office for assistance.    -The best way to get a hold of Ranjeet is to call the office at [phone] and ask to leave a message with his medical assistant. Ranjeet Menendez is a Psychiatric Mental Health Nurse Practitioner, due to his specialty patient's are not able to message him directly via [email or web address]. Please give my office up to 48 hours to respond to a patient call/question/refill request. Refill requests will be made during normal office hours only, Monday-Friday 8:00-5:30.      -Ranjeet is out of the office on Wednesdays and weekends. Tuesdays and Thursdays are Ranjeet's in-office days, Mondays and Fridays are his telehealth days.  The decision to be seen virtually or in person is up to the discretion of the provider, not all behavioral health problems are appropriate for telehealth.    -Please call the office at [phone] with any worsening of symptoms or onset of intolerable side effects.  -Follow-up appointments must be maintained in order for prescribing to continue.  -Patient has been educated regarding multimodal approach with healthy nutrition, healthy sleep, regular physical activity, social activities, counseling, and medications.   -Please call 911 or go to the nearest ER if you begin to have thoughts of harming yourself or other people.

## 2024-03-23 DIAGNOSIS — F41.0 GENERALIZED ANXIETY DISORDER WITH PANIC ATTACKS: Primary | ICD-10-CM

## 2024-03-23 DIAGNOSIS — Z56.6 STRESS AT WORK: ICD-10-CM

## 2024-03-23 DIAGNOSIS — F41.1 GENERALIZED ANXIETY DISORDER WITH PANIC ATTACKS: Primary | ICD-10-CM

## 2024-03-23 DIAGNOSIS — T88.7XXA SIDE EFFECT OF MEDICATION: ICD-10-CM

## 2024-03-23 RX ORDER — VILAZODONE HYDROCHLORIDE 10 MG/1
10 TABLET ORAL
Qty: 30 TABLET | Refills: 1 | Status: SHIPPED | OUTPATIENT
Start: 2024-03-23

## 2024-03-23 SDOH — HEALTH STABILITY - MENTAL HEALTH: OTHER PHYSICAL AND MENTAL STRAIN RELATED TO WORK: Z56.6

## 2024-03-25 DIAGNOSIS — I10 ESSENTIAL HYPERTENSION: ICD-10-CM

## 2024-03-25 RX ORDER — AMLODIPINE BESYLATE 5 MG/1
5 TABLET ORAL DAILY
Qty: 30 TABLET | Refills: 0 | Status: SHIPPED | OUTPATIENT
Start: 2024-03-25

## 2024-03-25 NOTE — TELEPHONE ENCOUNTER
Rx Refill Note  Requested Prescriptions     Pending Prescriptions Disp Refills    amLODIPine (NORVASC) 5 MG tablet [Pharmacy Med Name: AMLODIPINE BESYLATE 5 MG TAB] 30 tablet 0     Sig: TAKE 1 TABLET BY MOUTH DAILY      Last office visit with prescribing clinician: 4/28/2023   Last telemedicine visit with prescribing clinician: Visit date not found   Next office visit with prescribing clinician: Visit date not found                         Would you like a call back once the refill request has been completed: [] Yes [] No    If the office needs to give you a call back, can they leave a voicemail: [] Yes [] No    Loan Becerril MA  03/25/24, 10:45 EDT      **PT NEEDS APPOINTMENT FOR FUTURE REFILLS**

## 2024-04-12 ENCOUNTER — TELEMEDICINE (OUTPATIENT)
Dept: PSYCHIATRY | Facility: CLINIC | Age: 49
End: 2024-04-12
Payer: COMMERCIAL

## 2024-04-12 DIAGNOSIS — F41.1 GENERALIZED ANXIETY DISORDER: Primary | ICD-10-CM

## 2024-04-12 DIAGNOSIS — F33.1 MDD (MAJOR DEPRESSIVE DISORDER), RECURRENT EPISODE, MODERATE: ICD-10-CM

## 2024-04-12 NOTE — PROGRESS NOTES
This provider is located at the [facility] (through [facility]), [address], [place] [address] using a secure MyChart Video Visit through [employer]. Patient is being seen remotely via telehealth at home address in [place] and stated they are in a secure environment for this session. The patient's condition being diagnosed/treated is appropriate for telemedicine. The provider identified herself as well as her credentials. The patient, and/or patients guardian, consent to be seen remotely, and when consent is given they understand that the consent allows for patient identifiable information to be sent to a third party as needed. They may refuse to be seen remotely at any time. The electronic data is encrypted and password protected, and the patient and/or guardian has been advised of the potential risks to privacy not withstanding such measures.     You have chosen to receive care through a telehealth visit.  Do you consent to use a video/audio connection for your medical care today? Yes    Subjective   Alla Hinson is a 48 y.o. female who presents today for initial evaluation        Time In:  8:00  Time out: 9:05  Name of PCP: Neeraj Rodriguez MD   Referral source: Rolando Menendez, RODGER  [address] [place] [address]     Chief Complaint:   Chief Complaint   Patient presents with    Anxiety    Depression    Sleeping Problem    Headache      Pt reports daily anxiety occurring throughout most days with symptoms including feeling on edge, thought rumination, excessive worry, inability to control worry, feeling panicky, and intrusive thoughts. Pt reports depression daily throughout the day with symptoms including feeling down and sad, loneliness, feeling empty, irritability, decreased motivation, fatigue, and malaise. Pt reports persistent lack of desire to participate in enjoyable activities and little or no feeling of reward when doing so.     Pt states she  "has \"Always been an anxious person\"  Has tried different meds since earlly 30s to manage anx.  Anx in all areas of functioning almost daily. Reports difficulty staying focused.  Some depression mixed in, but anx is primary complaint, and Pt thinks the anx fuels the depression..  States, \"Things overwhelm me.\"  Pt states she has daughter who is a senior in high school and a demanding job. Pt states she often feels \"In over my head\" in her job.  Some hx of career switching.  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  Pt states she often questions, \"I'm not smart enough for this position.\"  \"I put pressure to myself.\"  Pt reports hx of self doubt, but states the anx and dep have caused a diminished self worth. Some self doubt with career - questions if she is qulified.  Started job in mid Covid with little support or instruction at the time.  \"Normalized with that\" but still feel career has been a \"roller coaster\"    Trying new med Viibrid.and has been on a couple of weeks but not long enough to tell a big different.  Pt reports she is beginning to see a little positive change in the depression.  Reports non-restorative sleep with periodic waking.  Pt admits to drinking 1-2 glasses of wine or beers a day in the evenings while relaxing or watching TV.  Pt drinks more on weekends \"maybe a little bit.\"    Pt has had headaches for many years and occasionally has a migraine.     Pt states she has a few \"weird routines\" but denies having obsessive or compulsive thoughts or behaviors. Has to have a glass of water and tissues \"just so so\" or she feels she cannot think or go to sleep. Pt reports mod-sever difficulty mult-tasking, especially over the past few months.    \"There are things that bother me or come back to me\" about Pt's hx but Pt denies trauma or PTSD.   Denies hx of manic epiosde.  Denies hypomanic  Pt has been frustrated a lot recently about forgetting things.  Denies memory issues, and Pt feels this " is due to anxiety and not being mindful and using active listening.    Both parents are living and are still . They live close by in the same town.      Pt is  and has never been divorce.    2 daughters ages 17 and 15    Pt has one sister and they are close    Patient adamantly and convincingly denies current suicidal or homicidal ideation or perceptual disturbance.    Childhood Experiences:   Has patient experienced a major accident or tragic events as a child? no       Has patient experienced any other significant life events or trauma (such as verbal, physical, sexual abuse)? no      Significant Life Events:  Has patient been through or witnessed a divorce? no      Has patient experienced a death / loss of relationship? No    Has patient experienced a major accident or tragic events? no      Has patient experienced any other significant life events or trauma (such as verbal, physical, sexual abuse)? no    Social History:   Social History     Socioeconomic History    Marital status:      Spouse name: Ronaldo    Number of children: 2    Highest education level: Bachelor's degree (e.g., BA, AB, BS)   Tobacco Use    Smoking status: Former     Current packs/day: 0.50     Average packs/day: 0.5 packs/day for 3.0 years (1.5 ttl pk-yrs)     Types: Cigarettes    Smokeless tobacco: Never    Tobacco comments:     College years   Substance and Sexual Activity    Alcohol use: Yes     Alcohol/week: 7.0 standard drinks of alcohol     Types: 4 Glasses of wine, 3 Cans of beer per week     Comment: 1 or less per day of wine or beer    Drug use: Never    Sexual activity: Yes     Partners: Male     Birth control/protection: I.U.D.     Marital Status:     Patient's current living situation: Patient lives with spouse  and with children      Support system: two parent,  family, significant other, friends, and patient siblings    Difficulty getting along with peers: no    Difficulty making new  friendships: no    Difficulty maintaining friendships: no    Close with family members: yes    Religous: no    Work History:  Highest level of education obtained: BS in geology    Ever been active duty in the ? no    Patient's Occupation: [employer]     Describe patient's current and past work experience: Bounced around out of college.  Always worked in environmental science.      Legal History:  The patient has no significant history of legal issues.    Past Medical History:  Past Medical History:   Diagnosis Date    Allergic     Anxiety     Tried meds previously    Depression     On and off for several years    Depression with anxiety 06/21/2022    Hypertension     Ovarian cyst        Past Surgical History:  Past Surgical History:   Procedure Laterality Date    APPENDECTOMY  11/10/2014    EYE SURGERY  2018?    Lasik       Physical Exam:   not currently breastfeeding. There is no height or weight on file to calculate BMI.     History of psychiatric treatment or hospitalization: Yes, describe: Denies Psychiatric hospitalization.  Pt tried a few family counseling sessions.  Pt has never seen a counselor for individual counseling to address her own needs.      Allergy:   No Known Allergies     Current Medications:   Current Outpatient Medications   Medication Sig Dispense Refill    ALLERGY SERUM INJECTION Inject  under the skin into the appropriate area as directed 1 (One) Time.      amLODIPine (NORVASC) 5 MG tablet TAKE 1 TABLET BY MOUTH DAILY 30 tablet 0    atenolol (TENORMIN) 25 MG tablet Take 1 tablet by mouth Daily. 90 tablet 3    cetirizine (zyrTEC) 10 MG tablet Take 1 tablet by mouth Daily.      diphenhydrAMINE (BENADRYL) 25 mg capsule Take 1 capsule by mouth Every Night.      fluticasone (FLONASE) 50 MCG/ACT nasal spray 1 spray into the nostril(s) as directed by provider Daily. PRN ONLY      ibuprofen (ADVIL,MOTRIN) 200 MG tablet Take 2 tablets by mouth Daily As Needed for Mild Pain.       vilazodone (Viibryd) 10 MG tablet tablet Take 1 tablet by mouth Daily With Breakfast. 30 tablet 1    norethindrone (MICRONOR) 0.35 MG tablet Take 1 tablet by mouth Daily. (Patient not taking: Reported on 4/12/2024) 28 tablet 12     No current facility-administered medications for this visit.         Family History:  Family History   Problem Relation Age of Onset    Hypertension Father     No Known Problems Mother     Breast cancer Neg Hx     Ovarian cancer Neg Hx     Uterine cancer Neg Hx     Colon cancer Neg Hx        Problem List:  Patient Active Problem List   Diagnosis    Essential hypertension    Anxiety    Eczema    Seasonal allergic rhinitis due to pollen    Healthcare maintenance    MDD (major depressive disorder), recurrent episode, moderate    Generalized anxiety disorder with panic attacks    Side effect of medication    Stress at work    Social anxiety disorder    Menopausal symptoms (focus/concentration/hot flashes)         History of Substance Use:   Patient answered Yes to experiencing two or more of the following problems related to substance use: using more than intended or over longer period than intended; difficulty quitting or cutting back use; spending a great deal of time obtaining, using, or recovering from using; craving or strong desire or urge to use;  work and/or school problems; financial problems; family problems; using in dangerous situations; physical or mental health problems; relapse; feelings of guilt or remorse about use; times when used and/or drank alone; needing to use more in order to achieve the desired effect; illness or withdrawal when stopping or cutting back use; using to relieve or avoid getting ill or developing withdrawal symptoms; and black outs and/or memory issues when using.        Substance Age Frequency Amount Method Last use   Nicotine        Alcohol teens 1-2 drinks daily current  1-2 beers or wine     Marijuana        Benzo        Pain Pills        Cocaine         Meth        Heroin        Suboxone        Synthetics/Other:            SUICIDE RISK ASSESSMENT/CSSRS  1. Does patient have thoughts of suicide? no  2. Does patient have intent for suicide? no  3. Does patient have a current plan for suicide? no  4. History of suicide attempts: no  5. Family history of suicide or attempts: no  6. History of violent behaviors towards others or property or thoughts of committing suicide: no  7. History of sexual aggression toward others: no  8. Access to firearms or weapons: no    PHQ-Score Total:  PHQ-9 Total Score: (P) 12    KEI-7 Score Total:  Over the last two weeks, how often have you been bothered by the following problems?  Feeling nervous, anxious or on edge: (P) Nearly every day  Not being able to stop or control worrying: (P) More than half the days  Worrying too much about different things: (P) Nearly every day  Trouble Relaxing: (P) More than half the days  Being so restless that it is hard to sit still: (P) More than half the days  Becoming easily annoyed or irritable: (P) Several days  Feeling afraid as if something awful might happen: (P) Several days  KEI 7 Total Score: (P) 14  If you checked any problems, how difficult have these problems made it for you to do your work, take care of things at home, or get along with other people: (P) Very difficult        Mental Status Exam:   Hygiene:   good  Cooperation:  Cooperative  Eye Contact:  Good  Psychomotor Behavior:  Restless  Affect:  Restricted  Mood: sad and depressed  Hopelessness: 2  Speech:  Normal  Thought Process:  Goal directed  Thought Content:  Normal  Suicidal:  None  Homicidal:  None  Hallucinations:  None  Delusion:  None  Memory:  Intact  Orientation:  Grossly intact  Reliability:  good  Insight:  Good  Judgement:  Good  Impulse Control:  Good    Impression/Formulation:    Patient appeared alert and oriented.  Patient is voluntarily requesting to begin outpatient therapy at [facility].  Patient is receptive to assistance with maintaining a stable lifestyle.  Patient presents with history of anxiety and depression. Patient is agreeable to attend routine therapy sessions.  Patient expressed desire to maintain stability and participate in the therapeutic process.        Assessment and Plan: Pt convincingly denies SI/HI/SIB at this time. Pt will use learned coping skills to manage symptoms and reports any change in mood or behavior. Pt will review informational material posted on Pt's  MyChart. Pt will keep follow up appointment or reschedule if unable to keep appointment.Pt would benefit from continued individual therapy to address Pt's presenting problems. Pt would benefit from gaining a better understanding of their issues and learning coping skills and therapeutic tools to assist Pt in alleviating symptoms and improve daily functioning.      Visit Diagnoses:    ICD-10-CM ICD-9-CM   1. Generalized anxiety disorder  F41.1 300.02   2. MDD (major depressive disorder), recurrent episode, moderate  F33.1 296.32          Functional Status: Moderate impairment       Treatment Plan: Continue supportive psychotherapy efforts and medications as indicated. Obtain release of information for current treatment team for continuity of care as needed. Patient will adhere to medication regimen as prescribed and report any side effects. Patient will contact this office, call 911 or present to the nearest emergency room should suicidal or homicidal ideations occur.    Short Term Goals: Patient will be compliant with medication, and patient will have no significant medication related side effects.  Patient will be engaged in psychotherapy as indicated.  Patient will report subjective improvement of symptoms.    Long Term Goals: To stabilize mood and treat/improve subjective symptoms, the patient will stay out of the hospital, the patient will be at an optimal level of functioning, and the patient  will take all medications as prescribed.The patient verbalized understanding and agreement with goals that were mutually set.    Crisis Plan:    If symptoms/behaviors persist, patient will present to the nearest hospital for an assessment. Advised patient of [facility] 24/7 assessment services.         This document has been electronically signed by MARY Brown  April 12, 2024 08:00 EDT     Part of this note may be an electronic transcription/translation of spoken language to printed text using the Dragon Dictation System.

## 2024-04-20 DIAGNOSIS — I10 ESSENTIAL HYPERTENSION: ICD-10-CM

## 2024-04-20 DIAGNOSIS — F41.9 ANXIETY: ICD-10-CM

## 2024-04-22 ENCOUNTER — TELEMEDICINE (OUTPATIENT)
Dept: BEHAVIORAL HEALTH | Facility: CLINIC | Age: 49
End: 2024-04-22
Payer: COMMERCIAL

## 2024-04-22 DIAGNOSIS — F41.1 GENERALIZED ANXIETY DISORDER WITH PANIC ATTACKS: Primary | ICD-10-CM

## 2024-04-22 DIAGNOSIS — F41.0 GENERALIZED ANXIETY DISORDER WITH PANIC ATTACKS: Primary | ICD-10-CM

## 2024-04-22 DIAGNOSIS — Z79.899 HIGH RISK MEDICATION USE: ICD-10-CM

## 2024-04-22 PROCEDURE — 99214 OFFICE O/P EST MOD 30 MIN: CPT

## 2024-04-22 RX ORDER — ATENOLOL 25 MG/1
25 TABLET ORAL DAILY
Qty: 30 TABLET | Refills: 0 | Status: SHIPPED | OUTPATIENT
Start: 2024-04-22

## 2024-04-22 NOTE — PROGRESS NOTES
Patient assessed today via MyChart through EPIC pt is at home in a secure environment and verbalizes privacy during interview. JCARLOS Pollack is at home in a secure environment using a secure laptop.The patient's condition being diagnosed/treated is appropriate for telemedicine.The provider identified himself as well as his credentials.The patient, and/or patient's guardian, consent to be seen remotely, and when consent is given they understand that the consent allows for patient identifiable information to be sent to a third party as needed. They may refuse to be seen remotely at any time. The electronic data is encrypted and password protected, and the patient and/or guardian has been advised of the potential risks to privacy not withstanding such measures.    [facility] PROGRESS NOTE  [facility]  [address], [place] [address]    NAME: Alla Hinson     : 1975   MRN: [id number]     DATE: 2024    ALLERGY:Patient has no known allergies.     MEDICATIONS:  Current Outpatient Medications   Medication Instructions    ALLERGY SERUM INJECTION Subcutaneous, Once    amLODIPine (NORVASC) 5 mg, Oral, Daily    atenolol (TENORMIN) 25 mg, Oral, Daily    cetirizine (ZYRTEC) 10 mg, Oral, Daily    diphenhydrAMINE (BENADRYL) 25 mg capsule 1 tablet, Oral, Nightly    fluticasone (FLONASE) 50 MCG/ACT nasal spray 1 spray, Nasal, Daily, PRN ONLY    ibuprofen (ADVIL,MOTRIN) 400 mg, Oral, Daily PRN    vilazodone (VIIBRYD) 10 mg, Oral, Daily With Breakfast     VITALS: There were no vitals taken for this visit. No LMP recorded. Patient has had an implant.     Subjective     Chief Complaint   Patient presents with    Anxiety    Follow-up     HPI:  48 y.o. female presents to [facility] for F/U. Pt last seen roughly 6 weeks prior, at that time pt was instructed to have GeneSight testing done, results have been reviewed with patient, and she was started on Viibryd 10 mg a day has been on  "this for roughly 4 weeks.  Overall patient reports things have been going pretty good, reports she has noticed a little improvement but not much, denies symptoms of panic attacks but reports she does have instances where she will feel her heart race and situational situations including work or doctors appointments, patient family travel to [facility] where they are from to visit, used to live there has been gone X 17 years, patient requesting to have birth control removed from med list reports they are not taking this and are exploring other options, patient has seen new therapist once has F/U this Friday, agreed to keep Viibryd at current dose and follow-up in 6 weeks.    S/E to medications:  Mild appetite suppression and sour stomach    Sleep: Worsening, tied to stress/situational factors including has been not sleeping as well, travel, work    PHQ: (P) 6 improved from 11     KEI: (P) 7 improved from 12     New Medications: Denies    New Medical Problems: Denies    Major changes since last time being seen: Denies     Prior psychiatric medications:  Wellbutrin: Side effects at 300 mg  Effexor, was on for several years, benefit reported in the beginning  Zoloft, dose/duration unknown  Lexapro, dose/duration unknown  BuSpar 5 mg, patient took for 1 day and felt jittery, then restarted 5 mg bid that was somewhat effective then 10 mg was \"too much\"  Hydroxyzine, not effective/sedation      SUBSTANCE/SEXUAL HISTORY:  Social History     Socioeconomic History    Marital status:      Spouse name: Ronaldo    Number of children: 2    Highest education level: Bachelor's degree (e.g., BA, AB, BS)   Tobacco Use    Smoking status: Former     Current packs/day: 0.50     Average packs/day: 0.5 packs/day for 3.0 years (1.5 ttl pk-yrs)     Types: Cigarettes    Smokeless tobacco: Never    Tobacco comments:     College years   Substance and Sexual Activity    Alcohol use: Yes     Alcohol/week: 7.0 standard drinks of alcohol "     Types: 4 Glasses of wine, 3 Cans of beer per week     Comment: 1 or less per day of wine or beer    Drug use: Never    Sexual activity: Yes     Partners: Male     Birth control/protection: I.U.D.      FAMILY HISTORY:  family history includes Hypertension in her father; No Known Problems in her mother.     PAST MEDICAL HISTORY   has a past medical history of Allergic, Anxiety, Depression, Depression with anxiety (06/21/2022), Hypertension, and Ovarian cyst.    She has no past medical history of Alcohol abuse, Alcoholism, Anorexia nervosa, Autism spectrum disorder, Bipolar disorder, Borderline personality disorder, Bulimia nervosa, Disease of thyroid gland, Head injury, HIV disease, Liver disease, Obsessive-compulsive disorder, Oppositional defiant disorder, Panic disorder, Peripheral neuropathy, Psychosis, PTSD (post-traumatic stress disorder), Seizures, Self-injurious behavior, Substance abuse, Suicide attempt, or Violence, history of.     Review of Systems   Constitutional:  Positive for diaphoresis and fatigue. Negative for fever.        Hot flashes   Respiratory:  Negative for chest tightness and shortness of breath.    Cardiovascular:  Negative for chest pain.   Gastrointestinal:  Positive for nausea.   Neurological:  Positive for memory problem. Negative for dizziness and light-headedness.   Psychiatric/Behavioral:  Positive for decreased concentration and stress. Negative for sleep disturbance and suicidal ideas. The patient is nervous/anxious.      Objective   PHYSICAL EXAM:  Constitutional:       Appearance: Normal appearance.   HENT:      Head: Normocephalic.   Pulmonary:      Effort: Pulmonary effort is normal.   Skin:     General: Skin is dry.   Neurological:      Mental Status: He/She/They are alert and oriented to person, place, and time.     PHQ-9 Depression Screening  Little interest or pleasure in doing things? (P) 1-->several days   Feeling down, depressed, or hopeless? (P) 1-->several days    Trouble falling or staying asleep, or sleeping too much? (P) 1-->several days   Feeling tired or having little energy?     Poor appetite or overeating? (P) 0-->not at all   Feeling bad about yourself/you are a failure/have let yourself or your family down? (P) 0-->not at all   Trouble concentrating on things? (P) 1-->several days   Psychomotor agitation/retardation (P) 1-->several days   Thoughts about death/dying/suicide (P) 0-->not at all   PHQ-9 Total Score (P) 6   How difficult have these problems for you? (P) somewhat difficult     GAD7 Documentation:  Feeling nervous, anxious or on edge (P) 2   Not being able to stop or control worrying (P) 1   Worrying too much about different things (P) 1   Trouble relaxing (P) 1   Being so restless that it is hard to sit still (P) 1   Becoming easily annoyed or irritable (P) 1   Feeling Afraid as if something awful might happen (P) 0   KEI Total Score (P) 7   How difficult have these problems made it for you? (P) Somewhat difficult     MENTAL STATUS EXAM   General Appearance:  Cleanly groomed and dressed  Eye Contact:  Fair  Attitude:  Cooperative  Speech:  Normal rate, tone, volume  Mood and affect:  Anxious  Thought Process:  Goal-directed  Associations/ Thought Content:  No delusions  Hallucinations:  None  Suicidal Ideations:  Not present  Homicidal Ideation:  Not present  Sensorium:  Alert  Orientation:  Person, place, time and situation  Attention Span/ Concentration:  Easily distracted  Fund of Knowledge:  Appropriate for age and educational level  Intellectual Functioning:  Above average  Insight:  Limited  Judgement:  Fair  Reliability:  Fair  Impulse Control:  Poor     LABS:  CBC          1/6/2024    08:12   CBC   WBC 4.6    RBC 4.43    Hemoglobin 13.4    Hematocrit 39.6    MCV 89    MCH 30.2    MCHC 33.8    RDW 12.5    Platelets 348      No visits with results within 1 Month(s) from this visit.   Latest known visit with results is:   Results Encounter on  02/16/2024   Component Date Value Ref Range Status    Cologuard 03/18/2024 Negative  Negative Final    Comment:   NEGATIVE TEST RESULT. A negative Cologuard result indicates a low likelihood that a colorectal cancer (CRC) or advanced adenoma (adenomatous polyps with more advanced pre-malignant features)  is present. The chance that a person with a negative Cologuard test has a colorectal cancer is less than 1 in 1500 (negative predictive value >99.9%) or has an  advanced adenoma is less than  5.3% (negative predictive value 94.7%). These data are based on a prospective cross-sectional study of 10,000 individuals at average risk for colorectal cancer who were screened with both Cologuard and colonoscopy. (Tammy SABA. et al, N Engl J Med 2014;370(14):3020-0579) The normal value (reference range) for this assay is negative.    COLOGUARD RE-SCREENING RECOMMENDATION: Periodic colorectal cancer screening is an important part of preventive healthcare for asymptomatic individuals at average risk for colorectal cancer.  Following a negative Cologuard result, the American Cancer Society and U.S.                            Multi-Society Task Force screening guidelines recommend a Cologuard re-screening interval of 3 years.   References: American Cancer Society Guideline for Colorectal Cancer Screening: https://www.cancer.org/cancer/colon-rectal-cancer/xtmzfnsxb-ypqzlebmp-ajbnwzc/acs-recommendations.html.; Magdaleno HERNANDEZ, Moe STARK, Collin SANDHUK, Colorectal Cancer Screening: Recommendations for Physicians and Patients from the U.S. Multi-Society Task Force on Colorectal Cancer Screening , Am J Gastroenterology 2017; 112:4495-1217.    TEST DESCRIPTION: Composite algorithmic analysis of stool DNA-biomarkers with hemoglobin immunoassay.   Quantitative values of individual biomarkers are not reportable and are not associated with individual biomarker result reference ranges. Cologuard is intended for colorectal cancer screening of adults  of either sex, 45 years or older, who are at average-risk for colorectal cancer (CRC). Cologuard has been approved for use by the U.S. FDA. The performance of Cologuard was                            established in a cross sectional study of average-risk adults aged 50-84. Cologuard performance in patients ages 45 to 49 years was estimated by sub-group analysis of near-age groups. Colonoscopies performed for a positive result may find as the most clinically significant lesion: colorectal cancer [4.0%], advanced adenoma (including sessile serrated polyps greater than or equal to 1cm diameter) [20%] or non- advanced adenoma [31%]; or no colorectal neoplasia [45%]. These estimates are derived from a prospective cross-sectional screening study of 10,000 individuals at average risk for colorectal cancer who were screened with both Cologuard and colonoscopy. (Tammy Aguilar al, N Engl J Med 2014;370(14):1390-8552.) Cologuard may produce a false negative or false positive result (no colorectal cancer or precancerous polyp present at colonoscopy follow up). A negative Cologuard test result does not guarantee the absence of CRC or advanced adenoma (pre-cancer). The current Cologuard                            screening interval is every 3 years. (American Cancer Society and U.S. Multi-Society Task Force). Cologuard performance data in a 10,000 patient pivotal study using colonoscopy as the reference method can be accessed at the following location: www.Evodental.Redeem/results. Additional description of the Cologuard test process, warnings and precautions can be found at www.525j.com.cnogISBXrd.com.       Assessment    ASSESSMENT/TREATMENT PLAN/INSTRUCTIONS/EDUCATION    (F41.1,  F41.0) Generalized anxiety disorder with panic attacks    (Z79.899) High risk medication use     Anxiety, improving somewhat, continue Viibryd 10 mg a day with food (only been on 4 weeks)  High Risk Medication: JORGE reviewed, Xanax not filled since  3/8/24  Continue counseling/therapy with established therapist Rolando HANKINS, has seen only once  F/U 6 weeks    -Please call the office at [phone] with any worsening of symptoms or onset of intolerable side effects, please ask to leave a message with Ranjeet's medical assistant.  Please give my office up to 48 hours to respond to a patient call/question/refill request.  -Patient is agreeable to call 911 or go to the nearest ER should he/she/they have any thoughts of harm to self or others.  -Patient has been educated on providers schedule, contact information, and patient/provider expectations.   -Patient has been educated regarding multimodal approach with healthy nutrition, healthy sleep, regular physical activity, social activities, counseling, and medications.     Return in about 6 weeks (around 6/3/2024) for Recheck.    MEDICATION ISSUES:  JORGE: Reviewed 04/22/2024     Medications Discontinued During This Encounter   Medication Reason    norethindrone (MICRONOR) 0.35 MG tablet Historical Med - Therapy completed           No orders of the defined types were placed in this encounter.    -Barriers: history of multiple failed medications due to lack of efficacy and/or side effects , multiple psychiatric comorbidities , stress, and resistant/ambivalent to psychotherapy recommendations  -Strengths:  humor and positive attitude    -Short-Term Goals: Pt will be compliant with medication management and note improvement in S/S over the next 4 to 6 weeks or at next scheduled visit.  -Long-Term Goals: Pt will be compliant with the agreed treatment plan including medication regimen & F/U appt's and deny impairment in daily functioning over the next 6 months.      -Progress toward goal: Not at goal  -Functional Status: Mild impairment   -Prognosis: Guarded with Ongoing Treatment     SUMMARY/DISCUSSION:  Pt past social/medical/family history reviewed/updated. ROS conducted, medications/allergies, reviewed, patient was  screened today for depression/anxiety, PHQ/KEI scores reviewed.  Most recent vitals/labs reviewed. Pt was given appropriate time to ask questions and concerns were addressed. A thorough discussion was had that included review of disease process, need for continued monitoring and additional treatment options including use of pharmacological and non-pharmacological approaches to care, decisions were made and agreed upon by patient and provider. Discussed the risks, benefits, and potential side effects of the medications; patient ackowledged and verbally consented.     Part of this note may be an electronic transcription/translation of spoken language to printed text using the Dragon Dictation System. Some of the data in this electronic note has been brought forward from a previous encounter, any necessary changes have been made, it has been reviewed by this APRN, and it is accurate.    This document has been electronically signed by RODGER Hein April 22, 2024 13:46 EDT

## 2024-04-22 NOTE — TELEPHONE ENCOUNTER
Rx Refill Note  Requested Prescriptions     Pending Prescriptions Disp Refills    atenolol (TENORMIN) 25 MG tablet [Pharmacy Med Name: ATENOLOL 25 MG TABLET] 30 tablet 0     Sig: TAKE ONE TABLET BY MOUTH DAILY      Last office visit with prescribing clinician: 4/28/2023   Last telemedicine visit with prescribing clinician: Visit date not found   Next office visit with prescribing clinician: Visit date not found                         Would you like a call back once the refill request has been completed: [] Yes [] No    If the office needs to give you a call back, can they leave a voicemail: [] Yes [] No    Loan Becerril MA  04/22/24, 08:44 EDT

## 2024-04-22 NOTE — PATIENT INSTRUCTIONS
GENERAL NEW PATIENT INSTRUCTIONS:    -Please arrive in person or virtually 10-15 minutes prior to appointment to allow for registration/sign in/questionnaires. If you are seen virtually please review after visit summary (AVS)/patient instructions via [employer] for a summary of plan of care/changes in treatment plan. If you are having difficulties logging on or accessing [employer] please contact my office for assistance.    -The best way to get a hold of Ranjeet is to call the office at [phone] and ask to leave a message with his medical assistant. Ranjeet Menendez is a Psychiatric Mental Health Nurse Practitioner, due to his specialty patient's are not able to message him directly via [employer]. Please give my office up to 48 hours to respond to a patient call/question/refill request. Refill requests will be made during normal office hours only, Monday-Friday 8:00-5:30.      -Ranjeet is out of the office on Wednesdays and weekends. Tuesdays and Thursdays are Ranjeet's in-office days, Mondays and Fridays are his telehealth days.  The decision to be seen virtually or in person is up to the discretion of the provider, not all behavioral health problems are appropriate for telehealth.    -Please call the office at [phone] with any worsening of symptoms or onset of intolerable side effects.  -Follow-up appointments must be maintained in order for prescribing to continue.  -Patient has been educated regarding multimodal approach with healthy nutrition, healthy sleep, regular physical activity, social activities, counseling, and medications.   -Please call 911 or go to the nearest ER if you begin to have thoughts of harming yourself or other people.

## 2024-04-27 DIAGNOSIS — I10 ESSENTIAL HYPERTENSION: ICD-10-CM

## 2024-04-29 RX ORDER — AMLODIPINE BESYLATE 5 MG/1
5 TABLET ORAL DAILY
Qty: 30 TABLET | Refills: 0 | Status: SHIPPED | OUTPATIENT
Start: 2024-04-29

## 2024-04-29 NOTE — TELEPHONE ENCOUNTER
Rx Refill Note  Requested Prescriptions     Pending Prescriptions Disp Refills    amLODIPine (NORVASC) 5 MG tablet [Pharmacy Med Name: AMLODIPINE BESYLATE 5 MG TAB] 30 tablet 0     Sig: TAKE 1 TABLET BY MOUTH DAILY      Last office visit with prescribing clinician: 4/28/2023   Last telemedicine visit with prescribing clinician: Visit date not found   Next office visit with prescribing clinician: Visit date not found                         Would you like a call back once the refill request has been completed: [] Yes [] No    If the office needs to give you a call back, can they leave a voicemail: [] Yes [] No    Loan Becerril MA  04/29/24, 08:17 EDT

## 2024-05-03 ENCOUNTER — TELEMEDICINE (OUTPATIENT)
Dept: PSYCHIATRY | Facility: CLINIC | Age: 49
End: 2024-05-03
Payer: COMMERCIAL

## 2024-05-03 DIAGNOSIS — F33.1 MDD (MAJOR DEPRESSIVE DISORDER), RECURRENT EPISODE, MODERATE: ICD-10-CM

## 2024-05-03 DIAGNOSIS — F41.1 GENERALIZED ANXIETY DISORDER: Primary | ICD-10-CM

## 2024-05-03 PROCEDURE — 90837 PSYTX W PT 60 MINUTES: CPT | Performed by: COUNSELOR

## 2024-05-17 ENCOUNTER — TELEMEDICINE (OUTPATIENT)
Dept: PSYCHIATRY | Facility: CLINIC | Age: 49
End: 2024-05-17
Payer: COMMERCIAL

## 2024-05-17 DIAGNOSIS — F41.1 GENERALIZED ANXIETY DISORDER: Primary | ICD-10-CM

## 2024-05-17 DIAGNOSIS — F33.1 MDD (MAJOR DEPRESSIVE DISORDER), RECURRENT EPISODE, MODERATE: ICD-10-CM

## 2024-05-17 DIAGNOSIS — F42.4 SKIN PICKING HABIT: ICD-10-CM

## 2024-05-17 NOTE — PROGRESS NOTES
Date: May 17, 2024  Time In: 9:09  Time out: 10:11      This provider is located at the [facility]), [address], [place] [address] using a secure [employer] Video Visit through [employer]. Patient is being seen remotely via telehealth at home address in [place] and stated they are in a secure environment for this session. The patient's condition being diagnosed/treated is appropriate for telemedicine. The provider identified herself as well as her credentials. The patient, and/or patients guardian, consent to be seen remotely, and when consent is given they understand that the consent allows for patient identifiable information to be sent to a third party as needed. They may refuse to be seen remotely at any time. The electronic data is encrypted and password protected, and the patient and/or guardian has been advised of the potential risks to privacy not withstanding such measures.     You have chosen to receive care through a telehealth visit.  Do you consent to use a video/audio connection for your medical care today? Yes    Subjective   Alla Hinson is a 48 y.o. female who presents today for follow up appointment.    Chief Complaint:   Chief Complaint   Patient presents with    Anxiety    Depression        History of Present Illness: Rapport was established through conversation and unconditional positive regard. Recent events were discussed and how these events impact Pt's emotional health.   Pt reports successful use of learned coping skills 5 out of 10 times since last report.  Pt reports continuation of symptoms and states the intensity and duration of symptoms has remained unchanged since last report. Pt rates anxiety at 5/10 and depression at 4/10.     Sleep: Pt reports sleep has remained unchanged since last report. Healthy sleep habits were discussed such as maintaining a schedule, routine, avoiding caffeine, and limiting screen time  "before bed.    Appetite:  Pt reports appetite has been good since last report.  Discussed the importance of hydration and eating a healthy diet for overall and mental health.    Medication compliance: Pt reports medications are being taken daily as prescribed. Discussed the importance of compliance with prescriber's directions and Pt was instructed to report questions/concerns, as well as missed doses or discontinuation of medication by Pt.     Safety Plan in Place: No Pt denies SI/HI/SIB recent or current    Daily Functioning:  Since last report, Pt states symptoms are causing Moderate difficulty in daily functioning.      Subjective Report:  Pt states she has been 'a little more up and down\" recently. Denies manic thoughts or behaviors. States her daughter is a senior and planning for graduation.  States this has caused Pt to be a feel a little more down than what would be a usual response for lamenting daughter's life phase. \"I think it hit me a little alvarez than it should have.\"  States she feels there is always something new coming up as a stressor.  Pt states she tries to use coping skills, but she gets easily derailed.  Talked about ways to compartmentalize and use mental file folders. Pt states she feels this will be a helpful tool.  Discussed ways Pt can minimize feeling self doubt by making notes to herself during meetings.  Pt disclosed skin picking tendency.  States it is embarrassing, causes sores, and is unpleasant. Suggested Pt make paper clip chains, use fidget objects, or manipulate an object, especially when Pt is in a video or phone meeting. Validated and normalized Pt's responses to stressors while encouraging therapeutic ways to mitigate negative symptoms.  .  Reviewed coping skills and encouraged Pt to continue to practicing coping skills daily when not under distress. Pt was praised for their attempts to decrease symptoms and mitigate activating events.    Clinical " Maneuvering/Intervention:  CBT was utilized to encourage Pt to identify maladaptive thoughts and behaviors and replace with more affirming and positive.Pt encouraged to set and maintain appropriate and healthy boundaries with others. Pt was instructed to practice daily using appropriate and specific words to communicate feelings to others.  Motivational interviewing used to encourage Pt to identify strengths which can be utilized in working toward treatment goals. Pt encouraged to practice daily learned skills such as controlled breathing, grounding, and mindfulness. Pt was encouraged to ask for help from support persons to assist them in maintaining stability and alleviate symptoms. Discussed the importance of being one's own mental health advocate and to refrain from seeing the need to ask for help as a weakness. Pt was encouraged to formulate a plan of action to be more proactive in managing stressors and refrain from using reactive and automatic heightened emotional responses.     Solution-focused therapy employed to identify how Pt would like their life to be if they were to make positive changes. Pt encouraged to identify effective coping skills and strengths they can use to continue utilizing those skills. Pt encouraged to discontinue utilizing non-effective coping mechanisms. Pt provided with feedback to highlight achievements and personal and other resources. Encouraged use of SMART goals    Assisted patient in processing above session content; acknowledged and normalized patient’s thoughts, feelings, and concerns.  Rationalized patient thought process regarding concerns presented at session.  Discussed triggers associated with patient's  anxiety  and depression  Also discussed coping skills for patient to implement such as mindfulness , increasing activity , self care , and positive self talk     Allowed patient to freely discuss issues without interruption or judgment. Provided safe, confidential  environment to facilitate the development of positive therapeutic relationship and encourage open, honest communication. Assisted patient in identifying risk factors which would indicate the need for higher level of care including thoughts to harm self or others and/or self-harming behavior and encouraged patient to contact this office, call 911, or present to the nearest emergency room should any of these events occur. Discussed crisis intervention services and means to access. Patient adamantly and convincingly denies current suicidal or homicidal ideation or perceptual disturbance.    Assessment:     Patient appears to maintain relative stability as compared to their baseline.  However, patient persistently struggles with symptoms which continue to cause impairment in important areas of functioning.  As a result, they can be reasonably expected to continue to benefit from treatment and would likely be at increased risk for decompensation otherwise.      PHQ-Score Total:  PHQ-9 Total Score: Prior to or during assessment Pt began to verbally process feelings over recent events, and Counselor allowed Pt to continue processing without interruption. Pt denies recent or current presence of thoughts of wanting to harm self or end their life.     KEI-7 Score Total:         Mental Status Exam:   Hygiene:   good  Cooperation:  Cooperative  Eye Contact:  Good  Psychomotor Behavior:  Appropriate  Affect:  Full range  Mood: depressed and anxious  Speech:  Normal  Thought Process:  Goal directed  Thought Content:  Normal  Suicidal:  None  Homicidal: None  Hallucinations:  None  Delusion: None  Memory:  Intact  Orientation:  Grossly Intact  Reliability:  good  Insight:  Good  Judgement:  Good  Impulse Control:  Good  Physical/Medical Issues:  No        Functional Status: Mild-moderate impairment    Progress toward goal: Not at goal    Prognosis: Good with continued therapeutic intervention        Plan:    Patient will continue  in individual outpatient therapy with focus on improved functioning and coping skills, maintaining stability, and avoiding decompensation and the need for higher level of care.    Patient will adhere to medication regimen as prescribed and report any side effects. Patient will contact this office, call 911 or present to the nearest emergency room should suicidal or homicidal ideations occur. Provide Cognitive Behavioral Therapy and Solution Focused Therapy to improve functioning, maintain stability, and avoid decompensation and the need for higher level of care.     Return in about 2 weeks (around 5/31/2024).      VISIT DIAGNOSIS:    Diagnosis Plan   1. Generalized anxiety disorder        2. MDD (major depressive disorder), recurrent episode, moderate        3. Skin picking habit         09:09 EDT       This document has been electronically signed by MARY Brown  May 17, 2024      Part of this note may be an electronic transcription/translation of spoken language to printed text using the Dragon Dictation System.

## 2024-05-17 NOTE — TREATMENT PLAN
Multi-Disciplinary Problems (from Behavioral Health Treatment Plan)      Active Problems       Problem: Adjustment  Start Date: 05/17/24      Problem Details: The patient self-scales this problem as a 4 with 10 being the worst.          Goal Priority Start Date Expected End Date End Date    Patient will implement healthy coping strategies. -- 05/17/24 -- --    Goal Details: Progress toward goal:  Not appropriate to rate progress toward goal since this is the initial treatment plan.          Goal Intervention Frequency Start Date End Date    Assist patient to identify and develop healthy coping strategies Q2 Weeks 05/17/24 --    Intervention Details: Duration of treatment until until remission of symptoms.                  Problem: Anxiety  Start Date: 05/17/24      Problem Details: The patient self-scales this problem as a 5 with 10 being the worst.          Goal Priority Start Date Expected End Date End Date    Patient will develop and implement behavioral and cognitive strategies to reduce anxiety and irrational fears. -- 05/17/24 -- --    Goal Details: Progress toward goal:  Not appropriate to rate progress toward goal since this is the initial treatment plan.          Goal Intervention Frequency Start Date End Date    Help patient explore past emotional issues in relation to present anxiety. Q2 Weeks 05/17/24 --    Intervention Details: Duration of treatment until until discharged.          Goal Intervention Frequency Start Date End Date    Help patient develop an awareness of their cognitive and physical responses to anxiety. Q2 Weeks 05/17/24 --    Intervention Details: Duration of treatment until until remission of symptoms.                  Problem: Depression  Start Date: 05/17/24      Problem Details: The patient self-scales this problem as a 3 with 10 being the worst.          Goal Priority Start Date Expected End Date End Date    Patient will demonstrate the ability to initiate new constructive life  skills outside of sessions on a consistent basis. -- 05/17/24 -- --    Goal Details: Progress toward goal:  Not appropriate to rate progress toward goal since this is the initial treatment plan.          Goal Intervention Frequency Start Date End Date    Assist patient in setting attainable activities of daily living goals. PRN 05/17/24 --    Intervention Details: Most of Pt's depression is tituational  More impactful responses to life stressors than co        Goal Intervention Frequency Start Date End Date    Provide education about depression Q2 Weeks 05/17/24 --    Intervention Details: Duration of treatment until until remission of symptoms.          Goal Intervention Frequency Start Date End Date    Assist patient in developing healthy coping strategies. Q2 Weeks 05/17/24 --    Intervention Details: Duration of treatment until until remission of symptoms.                  Problem: Vocational Stress  Start Date: 05/17/24      Problem Details: The patient self-scales this problem as a 7 with 10 being the worst.          Goal Priority Start Date Expected End Date End Date    Patient will develop a constructive action plan that will reduce specific areas of work stress. -- 05/17/24 -- --    Goal Details: Progress toward goal:  Not appropriate to rate progress toward goal since this is the initial treatment plan.          Goal Intervention Frequency Start Date End Date    Assist patient in identifying emotions and cognitive messages surrounding the work stress. Q2 Weeks 05/17/24 --    Intervention Details: Duration of treatment until until remission of symptoms.          Goal Intervention Frequency Start Date End Date    Reinforce realistic self appraisal of patient's successes and challenges at work. Q2 Weeks 05/17/24 --    Intervention Details: Duration of treatment until until remission of symptoms.                                 I have discussed and reviewed this treatment plan with the patient.  It has been  printed for signatures.

## 2024-05-19 DIAGNOSIS — I10 ESSENTIAL HYPERTENSION: ICD-10-CM

## 2024-05-19 DIAGNOSIS — F41.9 ANXIETY: ICD-10-CM

## 2024-05-20 DIAGNOSIS — F41.1 GENERALIZED ANXIETY DISORDER WITH PANIC ATTACKS: ICD-10-CM

## 2024-05-20 DIAGNOSIS — F41.0 GENERALIZED ANXIETY DISORDER WITH PANIC ATTACKS: ICD-10-CM

## 2024-05-20 DIAGNOSIS — Z56.6 STRESS AT WORK: ICD-10-CM

## 2024-05-20 RX ORDER — VILAZODONE HYDROCHLORIDE 10 MG/1
10 TABLET ORAL
Qty: 30 TABLET | Refills: 1 | Status: SHIPPED | OUTPATIENT
Start: 2024-05-20

## 2024-05-20 RX ORDER — ATENOLOL 25 MG/1
25 TABLET ORAL DAILY
Qty: 30 TABLET | Refills: 0 | OUTPATIENT
Start: 2024-05-20

## 2024-05-20 SDOH — HEALTH STABILITY - MENTAL HEALTH: OTHER PHYSICAL AND MENTAL STRAIN RELATED TO WORK: Z56.6

## 2024-05-22 ENCOUNTER — OFFICE VISIT (OUTPATIENT)
Dept: INTERNAL MEDICINE | Facility: CLINIC | Age: 49
End: 2024-05-22
Payer: COMMERCIAL

## 2024-05-22 VITALS
DIASTOLIC BLOOD PRESSURE: 90 MMHG | WEIGHT: 164 LBS | TEMPERATURE: 97.8 F | BODY MASS INDEX: 25.74 KG/M2 | SYSTOLIC BLOOD PRESSURE: 142 MMHG | OXYGEN SATURATION: 97 % | RESPIRATION RATE: 18 BRPM | HEIGHT: 67 IN | HEART RATE: 70 BPM

## 2024-05-22 DIAGNOSIS — I10 ESSENTIAL HYPERTENSION: ICD-10-CM

## 2024-05-22 DIAGNOSIS — R35.0 URINE FREQUENCY: Primary | ICD-10-CM

## 2024-05-22 DIAGNOSIS — L20.84 INTRINSIC ECZEMA: ICD-10-CM

## 2024-05-22 DIAGNOSIS — F41.9 ANXIETY: ICD-10-CM

## 2024-05-22 LAB
BILIRUB BLD-MCNC: NEGATIVE MG/DL
CLARITY, POC: CLEAR
COLOR UR: YELLOW
EXPIRATION DATE: NORMAL
GLUCOSE UR STRIP-MCNC: NEGATIVE MG/DL
KETONES UR QL: NEGATIVE
LEUKOCYTE EST, POC: NEGATIVE
Lab: NORMAL
NITRITE UR-MCNC: NEGATIVE MG/ML
PH UR: 7 [PH] (ref 5–8)
PROT UR STRIP-MCNC: NEGATIVE MG/DL
RBC # UR STRIP: NEGATIVE /UL
SP GR UR: 1 (ref 1–1.03)
UROBILINOGEN UR QL: NORMAL

## 2024-05-22 PROCEDURE — 81003 URINALYSIS AUTO W/O SCOPE: CPT | Performed by: FAMILY MEDICINE

## 2024-05-22 PROCEDURE — 99214 OFFICE O/P EST MOD 30 MIN: CPT | Performed by: FAMILY MEDICINE

## 2024-05-22 RX ORDER — SOLIFENACIN SUCCINATE 5 MG/1
5 TABLET, FILM COATED ORAL DAILY
Qty: 30 TABLET | Refills: 0 | Status: SHIPPED | OUTPATIENT
Start: 2024-05-22

## 2024-05-22 RX ORDER — TRIAMCINOLONE ACETONIDE 1 MG/G
1 CREAM TOPICAL 2 TIMES DAILY
Qty: 45 G | Refills: 0 | Status: SHIPPED | OUTPATIENT
Start: 2024-05-22

## 2024-05-22 RX ORDER — AMLODIPINE BESYLATE 5 MG/1
5 TABLET ORAL DAILY
Qty: 90 TABLET | Refills: 2 | Status: SHIPPED | OUTPATIENT
Start: 2024-05-22

## 2024-05-22 RX ORDER — ALPRAZOLAM 0.25 MG/1
TABLET ORAL
COMMUNITY
Start: 2024-04-27

## 2024-05-22 RX ORDER — ATENOLOL 25 MG/1
25 TABLET ORAL DAILY
Qty: 90 TABLET | Refills: 2 | Status: SHIPPED | OUTPATIENT
Start: 2024-05-22

## 2024-05-22 RX ORDER — NORETHINDRONE 0.35 MG/1
TABLET ORAL
COMMUNITY
Start: 2024-05-17

## 2024-05-22 NOTE — PROGRESS NOTES
"Chief Complaint  Eczema (Both Legs, sometimes on belly.) and Urinary Frequency    Subjective        Alla Hinson presents to [facility]  Eczema    Urinary Frequency   Associated symptoms include frequency.   Patient patient appointment to discuss acute symptoms urinary frequency over the last couple months no specific dysuria, fever sweats or chills  Also follows up for ongoing management hypertension well-controlled with amlodipine and metoprolol creatinine function is normal  She also has intermittent chronic eczema presently manifestation of lower extremities anterior bilaterally she does do a lot of gardening she has some underlying allergies as well  Change from Trintellix to Viibryd through psychology for a bit of anxiety depression    Objective   Vital Signs:  /90 (BP Location: Left arm, Patient Position: Sitting, Cuff Size: Adult) Comment: white coat syndro  Pulse 70   Temp 97.8 °F (36.6 °C) (Infrared)   Resp 18   Ht 170.2 cm (67\")   Wt 74.4 kg (164 lb)   SpO2 97%   BMI 25.69 kg/m²   Estimated body mass index is 25.69 kg/m² as calculated from the following:    Height as of this encounter: 170.2 cm (67\").    Weight as of this encounter: 74.4 kg (164 lb).             Physical Exam  Vitals and nursing note reviewed.   Constitutional:       Appearance: Normal appearance.   HENT:      Head: Normocephalic and atraumatic.   Cardiovascular:      Rate and Rhythm: Normal rate.      Pulses: Normal pulses.   Pulmonary:      Effort: Pulmonary effort is normal.   Skin:     General: Skin is warm.      Findings: Rash present.   Neurological:      Mental Status: She is alert.   Psychiatric:         Mood and Affect: Mood normal.         Behavior: Behavior normal.         Thought Content: Thought content normal.         Judgment: Judgment normal.        Result Review :      Common labs          1/6/2024    08:12   Common Labs   Glucose 85    BUN 17    Creatinine 0.92    Sodium " 140    Potassium 4.0    Chloride 105    Calcium 9.3    Total Protein 6.6    Albumin 4.5    Total Bilirubin 0.5    Alkaline Phosphatase 44    AST (SGOT) 17    ALT (SGPT) 16    WBC 4.6    Hemoglobin 13.4    Hematocrit 39.6    Platelets 348    Total Cholesterol 219    Triglycerides 81    HDL Cholesterol 62    LDL Cholesterol  143                   Assessment and Plan     Diagnoses and all orders for this visit:    1. Urine frequency (Primary)  -     POCT urinalysis dipstick, automated    2. Essential hypertension  -     atenolol (TENORMIN) 25 MG tablet; Take 1 tablet by mouth Daily.  Dispense: 90 tablet; Refill: 2  -     amLODIPine (NORVASC) 5 MG tablet; Take 1 tablet by mouth Daily.  Dispense: 90 tablet; Refill: 2    3. Anxiety  -     atenolol (TENORMIN) 25 MG tablet; Take 1 tablet by mouth Daily.  Dispense: 90 tablet; Refill: 2    4. Intrinsic eczema  -     Ambulatory Referral to Dermatology    Other orders  -     solifenacin (VESIcare) 5 MG tablet; Take 1 tablet by mouth Daily.  Dispense: 30 tablet; Refill: 0  -     triamcinolone (KENALOG) 0.1 % cream; Apply 1 Application topically to the appropriate area as directed 2 (Two) Times a Day.  Dispense: 45 g; Refill: 0             Follow Up     Return in about 1 month (around 6/22/2024) for Recheck.  Patient was given instructions and counseling regarding her condition or for health maintenance advice. Please see specific information pulled into the AVS if appropriate.         Answers submitted by the patient for this visit:  Other (Submitted on 5/20/2024)  Please describe your symptoms.: 1. Eczema rash on shins, very itchy. , 2. Frequent need to urinate/waking at night, 3. Rx refills  Have you had these symptoms before?: Yes  How long have you been having these symptoms?: Greater than 2 weeks  Please list any medications you are currently taking for this condition.: Steroid cream on shins (leftover from previous eczema)  Please describe any probable cause for these  symptoms. : Seasonal allergies (?)  Primary Reason for Visit (Submitted on 5/20/2024)  What is the primary reason for your visit?: Other

## 2024-05-24 ENCOUNTER — PATIENT ROUNDING (BHMG ONLY) (OUTPATIENT)
Dept: INTERNAL MEDICINE | Facility: CLINIC | Age: 49
End: 2024-05-24
Payer: COMMERCIAL

## 2024-05-24 NOTE — PROGRESS NOTES
May 24, 2024    Hello, may I speak with Alla Hinson?    My name is tess      I am  with [facility]  [address]  [place] [address] [phone].    Before we get started may I verify your date of birth? 1975    I am calling to officially welcome you to our practice and ask about your recent visit. Is this a good time to talk? no    Tell me about your visit with us. What things went well?  tess       We're always looking for ways to make our patients' experiences even better. Do you have recommendations on ways we may improve?  no    Overall were you satisfied with your first visit to our practice? yes       I appreciate you taking the time to speak with me today. Is there anything else I can do for you? no      Thank you, and have a great day.

## 2024-06-03 ENCOUNTER — TELEMEDICINE (OUTPATIENT)
Dept: BEHAVIORAL HEALTH | Facility: CLINIC | Age: 49
End: 2024-06-03
Payer: COMMERCIAL

## 2024-06-03 DIAGNOSIS — F41.1 GENERALIZED ANXIETY DISORDER WITH PANIC ATTACKS: Primary | ICD-10-CM

## 2024-06-03 DIAGNOSIS — Z79.899 HIGH RISK MEDICATION USE: ICD-10-CM

## 2024-06-03 DIAGNOSIS — F41.0 GENERALIZED ANXIETY DISORDER WITH PANIC ATTACKS: Primary | ICD-10-CM

## 2024-06-03 PROCEDURE — 99214 OFFICE O/P EST MOD 30 MIN: CPT

## 2024-06-03 RX ORDER — VILAZODONE HYDROCHLORIDE 10 MG/1
TABLET ORAL
Qty: 98 TABLET | Refills: 0 | Status: SHIPPED | OUTPATIENT
Start: 2024-06-03 | End: 2024-07-29
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
## 2024-06-03 NOTE — PATIENT INSTRUCTIONS
-Controlled Substance Instructions:  Patient has been educated on additional monitoring that is required including regular follow up appointments, JORGE reports, random urine drug screens, and random pill counts.    Patient has read/reviewed controlled substance agreement and understands this must be renewed yearly.  Patient educated that continuation of a controlled substance is at the discretion of the provider.     Patient educated that prescribing will not continue unless follow-up appointments are maintained.  Prescriptions can only be sent to a [place] pharmacy, 30 day supply with limited number of refills. Refill requests will only be made/reviewed during normal business hours.   Patient has been educated on the risk versus benefit of being prescribed a controlled substance including dependence/tolerance/abuse/addiction/diversion.  Patient has been educated on non-controlled substance alternatives that carry with them much less risk and require less monitoring.  If the patient is unwilling or unable to comply with the above listed monitoring the controlled substance will no longer be prescribed.      GENERAL NEW PATIENT INSTRUCTIONS:    -Please arrive in person or virtually 10-15 minutes prior to appointment to allow for registration/sign in/questionnaires. If you are seen virtually please review after visit summary (AVS)/patient instructions via [facility] for a summary of plan of care/changes in treatment plan. If you are having difficulties logging on or accessing [facility] please contact my office for assistance.    -The best way to get a hold of Ranjeet is to call the office at [phone] and ask to leave a message with his medical assistant. Ranjeet Menendez is a Psychiatric Mental Health Nurse Practitioner, due to his specialty patient's are not able to message him directly via [facility]. Please give my office up to 48 hours to respond to a patient call/question/refill request. Refill requests will be made during  normal office hours only, Monday-Friday 8:00-5:30.      -Ranjeet is out of the office on Fridays and weekends. Tuesdays and Thursdays are Ranjeet's in-office days, Mondays and Wednesdays are his telehealth days.  The decision to be seen virtually or in person is up to the discretion of the provider, not all behavioral health problems are appropriate for telehealth.    -Please call the office at [phone] with any worsening of symptoms or onset of intolerable side effects.  -Follow-up appointments must be maintained in order for prescribing to continue.  -Patient has been educated regarding multimodal approach with healthy nutrition, healthy sleep, regular physical activity, social activities, counseling, and medications.   -Please call 911 or go to the nearest ER if you begin to have thoughts of harming yourself or other people.

## 2024-06-10 ENCOUNTER — TELEPHONE (OUTPATIENT)
Age: 49
End: 2024-06-10

## 2024-06-14 ENCOUNTER — PATIENT ROUNDING (BHMG ONLY) (OUTPATIENT)
Age: 49
End: 2024-06-14
Payer: COMMERCIAL

## 2024-06-14 ENCOUNTER — OFFICE VISIT (OUTPATIENT)
Dept: INTERNAL MEDICINE | Facility: CLINIC | Age: 49
End: 2024-06-14
Payer: COMMERCIAL

## 2024-06-14 VITALS
DIASTOLIC BLOOD PRESSURE: 82 MMHG | WEIGHT: 168 LBS | OXYGEN SATURATION: 97 % | BODY MASS INDEX: 26.37 KG/M2 | SYSTOLIC BLOOD PRESSURE: 120 MMHG | HEIGHT: 67 IN | HEART RATE: 67 BPM | RESPIRATION RATE: 18 BRPM | TEMPERATURE: 98.6 F

## 2024-06-14 DIAGNOSIS — J02.0 ACUTE STREPTOCOCCAL PHARYNGITIS: Primary | ICD-10-CM

## 2024-06-14 DIAGNOSIS — F51.01 PRIMARY INSOMNIA: ICD-10-CM

## 2024-06-14 PROCEDURE — 99214 OFFICE O/P EST MOD 30 MIN: CPT | Performed by: FAMILY MEDICINE

## 2024-06-14 RX ORDER — CEPHALEXIN 500 MG/1
500 CAPSULE ORAL 2 TIMES DAILY
Qty: 20 CAPSULE | Refills: 0 | Status: SHIPPED | OUTPATIENT
Start: 2024-06-14

## 2024-06-14 RX ORDER — ZOLPIDEM TARTRATE 5 MG/1
5 TABLET ORAL NIGHTLY PRN
Qty: 30 TABLET | Refills: 1 | Status: SHIPPED | OUTPATIENT
Start: 2024-06-14

## 2024-06-14 NOTE — PROGRESS NOTES
"Chief Complaint  Sore Throat    Subjective        Alla Hinson presents to [facility]  Sore Throat       Patient follows up for ongoing management of sore throat she has persistent erythema and she is concerned about nodules she is on her right tonsil she also would like medication for insomnia she has tried her 's Ambien and it worked wonders for she like prescription of her own  Objective   Vital Signs:  /82 (BP Location: Left arm, Patient Position: Sitting, Cuff Size: Adult)   Pulse 67   Temp 98.6 °F (37 °C) (Temporal)   Resp 18   Ht 170.2 cm (67.01\")   Wt 76.2 kg (168 lb)   SpO2 97%   BMI 26.31 kg/m²   Estimated body mass index is 26.31 kg/m² as calculated from the following:    Height as of this encounter: 170.2 cm (67.01\").    Weight as of this encounter: 76.2 kg (168 lb).             Physical Exam  Vitals and nursing note reviewed.   Constitutional:       Appearance: Normal appearance.   HENT:      Right Ear: Tympanic membrane, ear canal and external ear normal.      Left Ear: Tympanic membrane, ear canal and external ear normal.      Mouth/Throat:      Pharynx: Posterior oropharyngeal erythema present.        Comments: Small raised papule 0.5 cm  Lymphadenopathy:      Cervical: No cervical adenopathy.   Neurological:      Mental Status: She is alert.   Psychiatric:         Mood and Affect: Mood normal.         Behavior: Behavior normal.         Thought Content: Thought content normal.         Judgment: Judgment normal.        Result Review :      Common labs          1/6/2024    08:12   Common Labs   Glucose 85    BUN 17    Creatinine 0.92    Sodium 140    Potassium 4.0    Chloride 105    Calcium 9.3    Total Protein 6.6    Albumin 4.5    Total Bilirubin 0.5    Alkaline Phosphatase 44    AST (SGOT) 17    ALT (SGPT) 16    WBC 4.6    Hemoglobin 13.4    Hematocrit 39.6    Platelets 348    Total Cholesterol 219    Triglycerides 81    HDL Cholesterol 62  "   LDL Cholesterol  143    Strep test + June 11, 2024               Assessment and Plan     Diagnoses and all orders for this visit:    1. Acute streptococcal pharyngitis (Primary)    2. Primary insomnia    Other orders  -     cephalexin (Keflex) 500 MG capsule; Take 1 capsule by mouth 2 (Two) Times a Day.  Dispense: 20 capsule; Refill: 0  -     zolpidem (AMBIEN) 5 MG tablet; Take 1 tablet by mouth At Night As Needed for Sleep.  Dispense: 30 tablet; Refill: 1             Follow Up     Return in about 1 month (around 7/14/2024), or if symptoms worsen or fail to improve, for Recheck.  Patient was given instructions and counseling regarding her condition or for health maintenance advice. Please see specific information pulled into the AVS if appropriate.         Answers submitted by the patient for this visit:  Primary Reason for Visit (Submitted on 6/14/2024)  What is the primary reason for your visit?: Sore Throat  Sore Throat Questionnaire (Submitted on 6/14/2024)  Chief Complaint: Sore throat  drainage: Yes  strep: Yes

## 2024-06-14 NOTE — ED NOTES
Thank you for letting us care for you in your recent visit to our [facility] urgent care center. We would love to hear about your experience with us. Was this the first time you have visited our location?    We’re always looking for ways to make our patients’ experiences even better. Do you have any recommendations on ways we may improve?     I appreciate you taking the time to respond. Please be on the lookout for a survey about your recent visit from [facility] via text or email. We would greatly appreciate if you could fill that out and turn it back in. We want your voice to be heard and we value your feedback.   Thank you for choosing [facility] for your healthcare needs.     If you have concerns or would like to speak to me in person regarding your visit please feel free to give me a call. [phone]    Hope you get well soon and thank you.    Joshua Browne LPN Practice Manager

## 2024-06-18 RX ORDER — SOLIFENACIN SUCCINATE 5 MG/1
5 TABLET, FILM COATED ORAL DAILY
Qty: 30 TABLET | Refills: 0 | Status: SHIPPED | OUTPATIENT
Start: 2024-06-18

## 2024-07-08 ENCOUNTER — TELEMEDICINE (OUTPATIENT)
Dept: PSYCHIATRY | Facility: CLINIC | Age: 49
End: 2024-07-08
Payer: COMMERCIAL

## 2024-07-08 DIAGNOSIS — F41.1 GENERALIZED ANXIETY DISORDER: ICD-10-CM

## 2024-07-08 DIAGNOSIS — F33.1 MDD (MAJOR DEPRESSIVE DISORDER), RECURRENT EPISODE, MODERATE: Primary | ICD-10-CM

## 2024-07-08 PROCEDURE — 90837 PSYTX W PT 60 MINUTES: CPT | Performed by: COUNSELOR

## 2024-07-08 NOTE — PROGRESS NOTES
Date: July 8, 2024  Time In: 10:04  Time out: 11:07      This provider is located at the [facility]), [address], [place] [address] using a secure [employer] Visit through [employer]. Patient is being seen remotely via telehealth at home address in [place] and stated they are in a secure environment for this session. The patient's condition being diagnosed/treated is appropriate for telemedicine. The provider identified herself as well as her credentials. The patient, and/or patients guardian, consent to be seen remotely, and when consent is given they understand that the consent allows for patient identifiable information to be sent to a third party as needed. They may refuse to be seen remotely at any time. The electronic data is encrypted and password protected, and the patient and/or guardian has been advised of the potential risks to privacy not withstanding such measures.     You have chosen to receive care through a telehealth visit.  Do you consent to use a video/audio connection for your medical care today? Yes    Subjective   Alla Hinson is a 48 y.o. female who presents today fully oriented, appropriately dressed and groomed, and open to communication for follow up appointment.    Chief Complaint:   Chief Complaint   Patient presents with    Anxiety    Depression        History of Present Illness: Rapport was established through conversation and unconditional positive regard. Recent events were discussed and how these events impact Pt's emotional health.   Pt reports successful use of learned coping skills 4 out of 10 times since last report.  Pt reports continuation of symptoms and states the intensity and duration of symptoms has worsened since last report. Pt rates anxiety at 7/10 and depression at 6/10.     Sleep: Pt reports sleep has remained unchanged since last report. Healthy sleep habits were discussed such as maintaining a  "schedule, routine, avoiding caffeine, and limiting screen time before bed.    Appetite:  Pt reports appetite has been good since last report.  Discussed the importance of hydration and eating a healthy diet for overall and mental health.    Medication compliance: Pt reports medications are being taken daily as prescribed. Discussed the importance of compliance with prescriber's directions and Pt was instructed to report questions/concerns, as well as missed doses or discontinuation of medication by Pt.     Safety Plan in Place: No Pt denies SI/HI/SIB recent or current    Daily Functioning:  Since last report, Pt states symptoms are causing Moderate difficulty in daily functioning.      Subjective Report:  Pt states she had strep throat and she feels this was due to getting  down after daughter's graduation and end of the school year activities.  Pt states the past few weeks have been more stressful than usual.  States she has been feeling \"kind of down\" and more irritable.  Took a vacation at the end of June.  Some feeling \"down in the dumps\" this past weekend. Pt states she felt like she \"ruined my own day\" by being inflexible in her expectations. States she is going to work on being more flexible and delegating tasks and hiring people to complete home projects. Pt identified personal strengths of streamlining, self reflection and willingness to improve.  Pt will identify another personal strength and report as next session for discussion. Through verbal processing and discussion with Counselor, Pt was able to identify how she can refrain from using all or nothing thinking and re-frame her thoughts about her situation. Pt gave the example of going to a thrift store and getting a good find instead of a planned boating trip this past weekend. Pt states she will work on re-framing situations and refrain from automatic emotional responses. Pt will work on being more flexible. .           .  Reviewed coping skills and " encouraged Pt to continue to practicing coping skills daily when not under distress. Pt was praised for their attempts to decrease symptoms and mitigate activating events.    Clinical Maneuvering/Intervention:  CBT was utilized to encourage Pt to identify maladaptive thoughts and behaviors and replace with more affirming and positive.Pt encouraged to set and maintain appropriate and healthy boundaries with others. Pt was instructed to practice daily using appropriate and specific words to communicate feelings to others.  Motivational interviewing used to encourage Pt to identify strengths which can be utilized in working toward treatment goals. Pt encouraged to practice daily learned skills such as controlled breathing, grounding, and mindfulness. Pt was encouraged to ask for help from support persons to assist them in maintaining stability and alleviate symptoms. Discussed the importance of being one's own mental health advocate and to refrain from seeing the need to ask for help as a weakness. Pt was encouraged to formulate a plan of action to be more proactive in managing stressors and refrain from using reactive and automatic heightened emotional responses.     Solution-focused therapy employed to identify how Pt would like their life to be if they were to make positive changes. Pt encouraged to identify effective coping skills and strengths they can use to continue utilizing those skills. Pt encouraged to discontinue utilizing non-effective coping mechanisms. Pt provided with feedback to highlight achievements and personal and other resources. Encouraged use of SMART goals    Assisted patient in processing above session content; acknowledged and normalized patient’s thoughts, feelings, and concerns.  Rationalized patient thought process regarding concerns presented at session.  Discussed triggers associated with patient's  anxiety  and depression  Also discussed coping skills for patient to implement such as  self care  and positive self talk     Allowed patient to freely discuss issues without interruption or judgment. Provided safe, confidential environment to facilitate the development of positive therapeutic relationship and encourage open, honest communication. Assisted patient in identifying risk factors which would indicate the need for higher level of care including thoughts to harm self or others and/or self-harming behavior and encouraged patient to contact this office, call 911, or present to the nearest emergency room should any of these events occur. Discussed crisis intervention services and means to access. Patient adamantly and convincingly denies current suicidal or homicidal ideation or perceptual disturbance.    Assessment:     Patient appears to maintain relative stability as compared to their baseline.  However, patient persistently struggles with symptoms which continue to cause impairment in important areas of functioning.  As a result, they can be reasonably expected to continue to benefit from treatment and would likely be at increased risk for decompensation otherwise.      PHQ-Score Total:  PHQ-9 Total Score: 12    KEI-7 Score Total:         Mental Status Exam:   Hygiene:   good  Cooperation:  Cooperative  Eye Contact:  Good  Psychomotor Behavior:  Appropriate  Affect:  Full range  Mood: depressed and anxious and irritable  Speech:  Normal  Thought Process:  Goal directed  Thought Content:  Normal  Suicidal:  None  Homicidal: None  Hallucinations:  None  Delusion: None  Memory:  Intact  Orientation:  Grossly Intact  Reliability:  good  Insight:  Good  Judgement:  Good  Impulse Control:  Good  Physical/Medical Issues:  No        Functional Status: Moderate impairment     Progress toward goal: Not at goal    Prognosis: Good with continued therapeutic intervention        Plan:    Patient will continue in individual outpatient therapy with focus on improved functioning and coping skills,  maintaining stability, and avoiding decompensation and the need for higher level of care.    Patient will adhere to medication regimen as prescribed and report any side effects. Patient will contact this office, call 911 or present to the nearest emergency room should suicidal or homicidal ideations occur. Provide Cognitive Behavioral Therapy and Solution Focused Therapy to improve functioning, maintain stability, and avoid decompensation and the need for higher level of care.     Return in about 2 weeks (around 7/22/2024).      VISIT DIAGNOSIS:    Diagnosis Plan   1. MDD (major depressive disorder), recurrent episode, moderate        2. Generalized anxiety disorder         10:02 EDT       This document has been electronically signed by MARY Brown  July 8, 2024      Part of this note may be an electronic transcription/translation of spoken language to printed text using the Dragon Dictation System.

## 2024-08-01 DIAGNOSIS — F41.1 GENERALIZED ANXIETY DISORDER WITH PANIC ATTACKS: ICD-10-CM

## 2024-08-01 DIAGNOSIS — F41.0 GENERALIZED ANXIETY DISORDER WITH PANIC ATTACKS: ICD-10-CM

## 2024-08-01 RX ORDER — VILAZODONE HYDROCHLORIDE 10 MG/1
TABLET ORAL
Qty: 98 TABLET | Refills: 0 | Status: SHIPPED | OUTPATIENT
Start: 2024-08-01

## 2024-08-08 DIAGNOSIS — F41.0 GENERALIZED ANXIETY DISORDER WITH PANIC ATTACKS: Primary | ICD-10-CM

## 2024-08-08 DIAGNOSIS — Z79.899 HIGH RISK MEDICATION USE: ICD-10-CM

## 2024-08-08 DIAGNOSIS — F41.1 GENERALIZED ANXIETY DISORDER WITH PANIC ATTACKS: Primary | ICD-10-CM

## 2024-08-09 RX ORDER — ALPRAZOLAM 0.25 MG/1
0.25 TABLET ORAL AS NEEDED
Qty: 15 TABLET | Refills: 2 | Status: SHIPPED | OUTPATIENT
Start: 2024-08-09 | End: 2024-09-08

## 2024-08-19 ENCOUNTER — TELEMEDICINE (OUTPATIENT)
Dept: BEHAVIORAL HEALTH | Facility: CLINIC | Age: 49
End: 2024-08-19
Payer: COMMERCIAL

## 2024-08-19 DIAGNOSIS — F41.0 GENERALIZED ANXIETY DISORDER WITH PANIC ATTACKS: ICD-10-CM

## 2024-08-19 DIAGNOSIS — F33.0 MILD EPISODE OF RECURRENT MAJOR DEPRESSIVE DISORDER: Primary | ICD-10-CM

## 2024-08-19 DIAGNOSIS — F41.1 GENERALIZED ANXIETY DISORDER WITH PANIC ATTACKS: ICD-10-CM

## 2024-08-19 DIAGNOSIS — Z79.899 HIGH RISK MEDICATION USE: ICD-10-CM

## 2024-08-19 PROCEDURE — 99214 OFFICE O/P EST MOD 30 MIN: CPT

## 2024-08-19 RX ORDER — VILAZODONE HYDROCHLORIDE 20 MG/1
20 TABLET ORAL
Qty: 90 TABLET | Refills: 1 | Status: SHIPPED | OUTPATIENT
Start: 2024-08-19

## 2024-08-19 NOTE — PATIENT INSTRUCTIONS
-Controlled Substance Instructions:  Patient has been educated on additional monitoring that is required including regular follow up appointments, JORGE reports, random urine drug screens, and random pill counts.    Patient has read/reviewed controlled substance agreement and understands this must be renewed yearly.  Patient educated that continuation of a controlled substance is at the discretion of the provider.     Patient educated that prescribing will not continue unless follow-up appointments are maintained.  Prescriptions can only be sent to a [place] pharmacy, 30 day supply with limited number of refills. Refill requests will only be made/reviewed during normal business hours.   Patient has been educated on the risk versus benefit of being prescribed a controlled substance including dependence/tolerance/abuse/addiction/diversion.  Patient has been educated on non-controlled substance alternatives that carry with them much less risk and require less monitoring.  If the patient is unwilling or unable to comply with the above listed monitoring the controlled substance will no longer be prescribed.      GENERAL NEW PATIENT INSTRUCTIONS:    -Please arrive in person or virtually 10-15 minutes prior to appointment to allow for registration/sign in/questionnaires. If you are seen virtually please review after visit summary (AVS)/patient instructions via [employer] for a summary of plan of care/changes in treatment plan. If you are having difficulties logging on or accessing [employer] please contact my office for assistance.    -The best way to get a hold of Ranjeet is to call the office at [phone] and ask to leave a message with his medical assistant. Ranjeet Menendez is a Psychiatric Mental Health Nurse Practitioner, due to his specialty patient's are not able to message him directly via [employer]. Please give my office up to 48 hours to respond to a patient call/question/refill request. Refill requests will be made during  normal office hours only, Monday-Friday 8:00-5:30.      -Ranjeet is out of the office on Fridays and weekends. Tuesdays and Thursdays are Ranjeet's in-office days, Mondays and Wednesdays are his telehealth days.  The decision to be seen virtually or in person is up to the discretion of the provider, not all behavioral health problems are appropriate for telehealth.    -Please call the office at [phone] with any worsening of symptoms or onset of intolerable side effects.  -Follow-up appointments must be maintained in order for prescribing to continue.  -Patient has been educated regarding multimodal approach with healthy nutrition, healthy sleep, regular physical activity, social activities, counseling, and medications.   -Please call 911 or go to the nearest ER if you begin to have thoughts of harming yourself or other people.    No show policy:  We understand unexpected circumstances arise; however, anytime you miss your appointment we are unable to provide you appropriate care.  In addition, each appointment missed could have been used to provide care for others.  We ask that you call at least 24 hours in advance to cancel or reschedule an appointment. We would like to take this opportunity to remind you of our policy stating patients who miss THREE or more appointments without cancelling or rescheduling 24 hours in advance of the appointment may be subject to cancellation of any further visits with our clinic and recommendation to seek in-person services/visits. Please call [phone] to reschedule your appointment. If there are reasons that make it difficult for you to keep the appointments, please call and let us know how we can help. Please understand that medication prescribing will not continue without seeing your provider.
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
## 2024-08-23 ENCOUNTER — TELEMEDICINE (OUTPATIENT)
Dept: PSYCHIATRY | Facility: CLINIC | Age: 49
End: 2024-08-23
Payer: COMMERCIAL

## 2024-08-23 ENCOUNTER — TELEPHONE (OUTPATIENT)
Dept: PSYCHIATRY | Facility: CLINIC | Age: 49
End: 2024-08-23

## 2024-08-23 DIAGNOSIS — F41.1 GENERALIZED ANXIETY DISORDER: Primary | ICD-10-CM

## 2024-08-23 DIAGNOSIS — F33.1 MDD (MAJOR DEPRESSIVE DISORDER), RECURRENT EPISODE, MODERATE: ICD-10-CM

## 2024-08-23 NOTE — PROGRESS NOTES
Date: August 23, 2024  Time In: 9:05  Time out: 10:00      This provider is located at the [facility]), [address], [place] [address] using a secure [employer] Video Visit through [email or web address]. Patient is being seen remotely via telehealth at home address in [place] and stated they are in a secure environment for this session. The patient's condition being diagnosed/treated is appropriate for telemedicine. The provider identified herself as well as her credentials. The patient, and/or patients guardian, consent to be seen remotely, and when consent is given they understand that the consent allows for patient identifiable information to be sent to a third party as needed. They may refuse to be seen remotely at any time. The electronic data is encrypted and password protected, and the patient and/or guardian has been advised of the potential risks to privacy not withstanding such measures.     You have chosen to receive care through a telehealth visit.  Do you consent to use a video/audio connection for your medical care today? Yes    Subjective   Alla Hinson is a 48 y.o. female who presents today fully oriented, appropriately dressed and groomed, and open to communication for follow up appointment.    Chief Complaint:   Chief Complaint   Patient presents with    Anxiety    Depression        History of Present Illness: Rapport was established through conversation and unconditional positive regard. Recent events were discussed and how these events impact Pt's emotional health.   Pt reports successful use of learned coping skills 4 out of 10 times since last report.  Pt reports continuation of symptoms and states the intensity and duration of symptoms has worsened since last report. Pt rates anxiety at 6/10 and depression at 6/10.     Sleep: Pt reports sleep has worsened since last report. Healthy sleep habits were discussed such as maintaining a  "schedule, routine, avoiding caffeine, and limiting screen time before bed.    Appetite:  Pt reports appetite has been good since last report.  Discussed the importance of hydration and eating a healthy diet for overall and mental health.    Medication compliance: Pt reports medications are being taken daily as prescribed. Discussed the importance of compliance with prescriber's directions and Pt was instructed to report questions/concerns, as well as missed doses or discontinuation of medication by Pt.     Safety Plan in Place: No Pt denies SI/HI/SIB recent or current    Daily Functioning:  Since last report, Pt states symptoms are causing Moderate difficulty in daily functioning.      Subjective Report and Content Discussion:  Pt states it has been a \"tough couple of weeks.\"  Moved daughter to college. Pt states she has been more irritability than usual. Pt states she was taking a steroid and she feels this may have contributed to the irritability.  States she feels guilty about being rough on her family, especially her teen daughter lately, and Pt states she has apologized for this.  Pt's feelings were normalized and Counselor and Pt discussed how back to school, medications, and other factors can affect one's mood and ability to regulate emotions.  Discussed the need for Pt to give herself jonny and not be so hard on herself.  Pt agrees she has a hx of expecting too much from herself.  Discussed the importance of self care and refraining from being her own critic.  Discussed the need for mindfulness and being more in the moment.      Reviewed coping skills and encouraged Pt to continue to practicing coping skills daily when not under distress. Pt was praised for their attempts to decrease symptoms and mitigate activating events.    Clinical Maneuvering/Intervention:  CBT was utilized to encourage Pt to identify maladaptive thoughts and behaviors and replace with more affirming and positive.Pt encouraged to set " and maintain appropriate and healthy boundaries with others. Pt was instructed to practice daily using appropriate and specific words to communicate feelings to others.  Motivational interviewing used to encourage Pt to identify strengths which can be utilized in working toward treatment goals. Pt encouraged to practice daily learned skills such as controlled breathing, grounding, and mindfulness. Pt was encouraged to ask for help from support persons to assist them in maintaining stability and alleviate symptoms. Discussed the importance of being one's own mental health advocate and to refrain from seeing the need to ask for help as a weakness. Pt was encouraged to formulate a plan of action to be more proactive in managing stressors and refrain from using reactive and automatic heightened emotional responses.     Solution-focused therapy employed to identify how Pt would like their life to be if they were to make positive changes. Pt encouraged to identify effective coping skills and strengths they can use to continue utilizing those skills. Pt encouraged to discontinue utilizing non-effective coping mechanisms. Pt provided with feedback to highlight achievements and personal and other resources. Encouraged use of SMART goals    Assisted patient in processing above session content; acknowledged and normalized patient’s thoughts, feelings, and concerns.  Rationalized patient thought process regarding concerns presented at session.  Discussed triggers associated with patient's  anxiety  and depression  Also discussed coping skills for patient to implement such as grounding , mindfulness , and positive self talk     Allowed patient to freely discuss issues without interruption or judgment. Provided safe, confidential environment to facilitate the development of positive therapeutic relationship and encourage open, honest communication. Assisted patient in identifying risk factors which would indicate the need for  higher level of care including thoughts to harm self or others and/or self-harming behavior and encouraged patient to contact this office, call 911, or present to the nearest emergency room should any of these events occur. Discussed crisis intervention services and means to access. Patient adamantly and convincingly denies current suicidal or homicidal ideation or perceptual disturbance.    Assessment:     Patient appears to maintain relative stability as compared to their baseline.  However, patient persistently struggles with symptoms which continue to cause impairment in important areas of functioning.  As a result, they can be reasonably expected to continue to benefit from treatment and would likely be at increased risk for decompensation otherwise.      PHQ-Score Total:  PHQ-9 Total Score: 12    KEI-7 Score Total:         Mental Status Exam:   Hygiene:   good  Cooperation:  Cooperative  Eye Contact:  Good  Psychomotor Behavior:  Appropriate  Affect:  Full range  Mood: depressed and anxious and irritable  Speech:  Normal  Thought Process:  Goal directed  Thought Content:  Normal  Suicidal:  None  Homicidal: None  Hallucinations:  None  Delusion: None  Memory:  Intact  Orientation:  Grossly Intact  Reliability:  good  Insight:  Good  Judgement:  Good  Impulse Control:  Good  Physical/Medical Issues:  No        Functional Status: Mild-moderate impairment    Progress toward goal: Not at goal    Prognosis: Good with continued therapeutic intervention        Plan:    Patient will continue in individual outpatient therapy with focus on improved functioning and coping skills, maintaining stability, and avoiding decompensation and the need for higher level of care.    Patient will adhere to medication regimen as prescribed and report any side effects. Patient will contact this office, call 911 or present to the nearest emergency room should suicidal or homicidal ideations occur. Provide Cognitive Behavioral Therapy  and Solution Focused Therapy to improve functioning, maintain stability, and avoid decompensation and the need for higher level of care.     Return in about 2 weeks (around 9/6/2024).      VISIT DIAGNOSIS:    Diagnosis Plan   1. Generalized anxiety disorder        2. MDD (major depressive disorder), recurrent episode, moderate         09:05 EDT       This document has been electronically signed by MARY Brown  August 23, 2024      Part of this note may be an electronic transcription/translation of spoken language to printed text using the Dragon Dictation System.

## 2024-08-23 NOTE — TELEPHONE ENCOUNTER
Called patient per providers request to schedule a few follow up appointments. Patient did not answer so I left a detailed vm explaining I would go ahead book the appointments and send a my chart message with dates and times. Also made patient aware if the appointments will not work for her to call the office to reschedule. Also sent the no show policy via my chart.

## 2024-09-19 ENCOUNTER — TELEMEDICINE (OUTPATIENT)
Dept: PSYCHIATRY | Facility: CLINIC | Age: 49
End: 2024-09-19
Payer: COMMERCIAL

## 2024-09-19 DIAGNOSIS — F41.1 GENERALIZED ANXIETY DISORDER: Primary | ICD-10-CM

## 2024-09-19 DIAGNOSIS — F33.1 MDD (MAJOR DEPRESSIVE DISORDER), RECURRENT EPISODE, MODERATE: ICD-10-CM

## 2024-10-03 ENCOUNTER — TELEMEDICINE (OUTPATIENT)
Dept: PSYCHIATRY | Facility: CLINIC | Age: 49
End: 2024-10-03
Payer: COMMERCIAL

## 2024-10-03 DIAGNOSIS — F41.1 GENERALIZED ANXIETY DISORDER: Primary | ICD-10-CM

## 2024-10-03 DIAGNOSIS — F33.1 MDD (MAJOR DEPRESSIVE DISORDER), RECURRENT EPISODE, MODERATE: ICD-10-CM

## 2024-10-03 NOTE — PROGRESS NOTES
Date: October 5, 2024  Time In: 9:02  Time out: 10:03      This provider is located at the [facility]), [address], [place] [address] using a secure [employer] Video Visit through [employer]. Patient is being seen remotely via telehealth at home address in [place] and stated they are in a secure environment for this session. The patient's condition being diagnosed/treated is appropriate for telemedicine. The provider identified herself as well as her credentials. The patient, and/or patients guardian, consent to be seen remotely, and when consent is given they understand that the consent allows for patient identifiable information to be sent to a third party as needed. They may refuse to be seen remotely at any time. The electronic data is encrypted and password protected, and the patient and/or guardian has been advised of the potential risks to privacy not withstanding such measures.     You have chosen to receive care through a telehealth visit.  Do you consent to use a video/audio connection for your medical care today? Yes    Subjective   Alla Hinson is a 48 y.o. female who presents today fully oriented, appropriately dressed and groomed, and open to communication for follow up appointment.    Chief Complaint:   Chief Complaint   Patient presents with    Anxiety    Depression        History of Present Illness: Rapport was established through conversation and unconditional positive regard. Recent events were discussed and how these events impact Pt's emotional health.   Pt reports successful use of learned coping skills 6 out of 10 times since last report.  Pt reports continuation of symptoms and states the intensity and duration of symptoms has remained unchanged since last report. Pt rates anxiety at 3/10 and depression at 2/10.     Sleep: Pt reports sleep has remained unchanged since last report. Healthy sleep habits were discussed such as  "maintaining a schedule, routine, avoiding caffeine, and limiting screen time before bed.    Appetite:  Pt reports appetite has been good since last report.  Discussed the importance of hydration and eating a healthy diet for overall and mental health.    Medication compliance: Pt reports medications are being taken daily as prescribed. Discussed the importance of compliance with prescriber's directions and Pt was instructed to report questions/concerns, as well as missed doses or discontinuation of medication by Pt.     Safety Plan in Place: No Pt denies SI/HI/SIB recent or current    Daily Functioning:  Since last report, Pt states symptoms are causing Mild difficulty in daily functioning.      Content Discussion:   Pt reports life updates since previous session. Pt identified current stressors. Pt acknowledged stressors within and outside of one's control. Pt identified successes and issues with utilizing coping mechanisms.   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  She takes connie out of this activity.  Discussed the need to prioritize \"normalcy\"   States she is considering asking for part time or fewer hours after firstof the year.  Discussed concerns with her job and ways Pt can manage negative feelings about her job.     Counselor supported Pt in continued exploration of underlying belief systems which contribute to difficulty in connecting/vulnerability.  Through discussion, Pt identifies themes of preservation and overt emotional and physical reactivity when physical and/or emotional statis is in question, even in low or perceived threatening situations. Counselor supported Pt in identifying past experiences and underlying beliefs which contribute to these feelings and reactions.  Pt was supported and encouraged in processing emotions related to frustrations with the expectations of self and others.  Pt was encouraged to identify cultural and nuclear " familial contexts which contribute to these concerns.  Pt was receptive and engaged in conversation, and Pt reports this was beneficial and applicable to their situation.      Reviewed coping skills and encouraged Pt to continue to practicing coping skills daily when not under distress. Pt was praised for their attempts to decrease symptoms and mitigate activating events.    Clinical Maneuvering/Intervention:  CBT was utilized to encourage Pt to identify maladaptive thoughts and behaviors and replace with more affirming and positive.Pt encouraged to set and maintain appropriate and healthy boundaries with others. Pt was instructed to practice daily using appropriate and specific words to communicate feelings to others.  Motivational interviewing used to encourage Pt to identify strengths which can be utilized in working toward treatment goals. Pt encouraged to practice daily learned skills such as controlled breathing, grounding, and mindfulness. Pt was encouraged to ask for help from support persons to assist them in maintaining stability and alleviate symptoms. Discussed the importance of being one's own mental health advocate and to refrain from seeing the need to ask for help as a weakness. Pt was encouraged to formulate a plan of action to be more proactive in managing stressors and refrain from using reactive and automatic heightened emotional responses.     Solution-focused therapy employed to identify how Pt would like their life to be if they were to make positive changes. Pt encouraged to identify effective coping skills and strengths they can use to continue utilizing those skills. Pt encouraged to discontinue utilizing non-effective coping mechanisms. Pt provided with feedback to highlight achievements and personal and other resources. Encouraged use of SMART goals    Assisted patient in processing above session content; acknowledged and normalized patient’s thoughts, feelings, and concerns.   Rationalized patient thought process regarding concerns presented at session.  Discussed triggers associated with patient's  anxiety  and depression  Also discussed coping skills for patient to implement such as grounding , increasing activity , self care , and positive self talk     Allowed patient to freely discuss issues without interruption or judgment. Provided safe, confidential environment to facilitate the development of positive therapeutic relationship and encourage open, honest communication. Assisted patient in identifying risk factors which would indicate the need for higher level of care including thoughts to harm self or others and/or self-harming behavior and encouraged patient to contact this office, call 911, or present to the nearest emergency room should any of these events occur. Discussed crisis intervention services and means to access. Patient adamantly and convincingly denies current suicidal or homicidal ideation or perceptual disturbance.    Assessment:     Patient appears to maintain relative stability as compared to their baseline.  However, patient persistently struggles with symptoms which continue to cause impairment in important areas of functioning.  As a result, they can be reasonably expected to continue to benefit from treatment and would likely be at increased risk for decompensation otherwise.      PHQ-Score Total:  PHQ-9 Total Score: 9    KEI-7 Score Total:         Mental Status Exam:   Hygiene:   good  Cooperation:  Cooperative  Eye Contact:  Closed  Psychomotor Behavior:  Appropriate  Affect:  Full range  Mood: depressed and anxious  Speech:  Normal  Thought Process:  Goal directed  Thought Content:  Normal  Suicidal:  None  Homicidal: None  Hallucinations:  None  Delusion: None  Memory:  Intact  Orientation:  Grossly Intact  Reliability:  good  Insight:  Good  Judgement:  Good  Impulse Control:  Good  Physical/Medical Issues:  No        Functional Status: Mild impairment      Progress toward goal: Not at goal    Prognosis: Good with continued therapeutic intervention        Plan:    Patient will continue in individual outpatient therapy with focus on improved functioning and coping skills, maintaining stability, and avoiding decompensation and the need for higher level of care.    Patient will adhere to medication regimen as prescribed and report any side effects. Patient will contact this office, call 911 or present to the nearest emergency room should suicidal or homicidal ideations occur. Provide Cognitive Behavioral Therapy and Solution Focused Therapy to improve functioning, maintain stability, and avoid decompensation and the need for higher level of care.     Return in about 2 weeks (around 10/17/2024).      VISIT DIAGNOSIS:    Diagnosis Plan   1. Generalized anxiety disorder        2. MDD (major depressive disorder), recurrent episode, moderate         09:01 EDT       This document has been electronically signed by MARY Brown  October 5, 2024      Part of this note may be an electronic transcription/translation of spoken language to printed text using the Dragon Dictation System.

## 2024-10-17 ENCOUNTER — TELEMEDICINE (OUTPATIENT)
Dept: PSYCHIATRY | Facility: CLINIC | Age: 49
End: 2024-10-17
Payer: COMMERCIAL

## 2024-10-17 DIAGNOSIS — F33.1 MDD (MAJOR DEPRESSIVE DISORDER), RECURRENT EPISODE, MODERATE: ICD-10-CM

## 2024-10-17 DIAGNOSIS — F41.1 GENERALIZED ANXIETY DISORDER: Primary | ICD-10-CM

## 2024-10-17 NOTE — PROGRESS NOTES
Date: October 19, 2024  Time In: 10:04  Time out: 11:02      This provider is located at the [facility] ([facility]), [address], [place] [address] using a secure [employer] Video Visit through [employer]. Patient is being seen remotely via telehealth at home address in [place] and stated they are in a secure environment for this session. The patient's condition being diagnosed/treated is appropriate for telemedicine. The provider identified herself as well as her credentials. The patient, and/or patients guardian, consent to be seen remotely, and when consent is given they understand that the consent allows for patient identifiable information to be sent to a third party as needed. They may refuse to be seen remotely at any time. The electronic data is encrypted and password protected, and the patient and/or guardian has been advised of the potential risks to privacy not withstanding such measures.     You have chosen to receive care through a telehealth visit.  Do you consent to use a video/audio connection for your medical care today? Yes    Subjective   Alla Hinson is a 48 y.o. female who presents today fully oriented, appropriately dressed and groomed, and open to communication for follow up appointment.    Chief Complaint:   Chief Complaint   Patient presents with    Anxiety    Depression        History of Present Illness: Rapport was established through conversation and unconditional positive regard. Recent events were discussed and how these events impact Pt's emotional health.   Pt reports successful use of learned coping skills 7 out of 10 times since last report.  Pt reports continuation of symptoms and states the intensity and duration of symptoms has remained unchanged since last report. Pt rates anxiety at 5/10 and depression at 4/10.     Sleep: Pt reports sleep has remained unchanged since last report. Healthy sleep habits were discussed such as  maintaining a schedule, routine, avoiding caffeine, and limiting screen time before bed.    Appetite:  Pt reports appetite has been good since last report.  Discussed the importance of hydration and eating a healthy diet for overall and mental health.    Medication compliance: Pt reports medications are being taken daily as prescribed. Discussed the importance of compliance with prescriber's directions and Pt was instructed to report questions/concerns, as well as missed doses or discontinuation of medication by Pt.     Safety Plan in Place: No Pt denies SI/HI/SIB recent or current    Daily Functioning:  Since last report, Pt states symptoms are causing Mild difficulty in daily functioning.      Content Discussion:   Pt reports life updates since previous session. Pt identified current stressors. Pt acknowledged stressors within and outside of one's control. Pt identified successes and issues with utilizing coping mechanisms.  Pt reports her children had fall breaks and she got to spend some time with her college age daughter.  Pt states they went to [place] and they had an enjoyable time.    States she has not been sleeping optimally and has been dealing with teen daughter's concerns. States she often looks a political news right before bed.  Pt states she is going to limit watching news and social media due to the negativity causing Pt to feel anxious and sleep disturbance. Pt's feelings were validated and normalized and Pt was encouraged to using calming and breathing exercises to mitigate anxious symptoms.      Counselor supported Pt in continued exploration of underlying belief systems which contribute to difficulty in connecting/vulnerability.  Through discussion, Pt identifies themes of preservation and overt emotional and physical reactivity when physical and/or emotional statis is in question, even in low or perceived threatening situations. Counselor supported Pt in identifying past  experiences and underlying beliefs which contribute to these feelings and reactions.  Pt was supported and encouraged in processing emotions related to frustrations with the expectations of self and others.  Pt was encouraged to identify cultural and nuclear familial contexts which contribute to these concerns.  Pt was receptive and engaged in conversation, and Pt reports this was beneficial and applicable to their situation.      Reviewed coping skills and encouraged Pt to continue to practicing coping skills daily when not under distress. Pt was praised for their attempts to decrease symptoms and mitigate activating events.    Clinical Maneuvering/Intervention:  CBT was utilized to encourage Pt to identify maladaptive thoughts and behaviors and replace with more affirming and positive.Pt encouraged to set and maintain appropriate and healthy boundaries with others. Pt was instructed to practice daily using appropriate and specific words to communicate feelings to others.  Motivational interviewing used to encourage Pt to identify strengths which can be utilized in working toward treatment goals. Pt encouraged to practice daily learned skills such as controlled breathing, grounding, and mindfulness. Pt was encouraged to ask for help from support persons to assist them in maintaining stability and alleviate symptoms. Discussed the importance of being one's own mental health advocate and to refrain from seeing the need to ask for help as a weakness. Pt was encouraged to formulate a plan of action to be more proactive in managing stressors and refrain from using reactive and automatic heightened emotional responses.     Solution-focused therapy employed to identify how Pt would like their life to be if they were to make positive changes. Pt encouraged to identify effective coping skills and strengths they can use to continue utilizing those skills. Pt encouraged to discontinue utilizing non-effective coping  mechanisms. Pt provided with feedback to highlight achievements and personal and other resources. Encouraged use of SMART goals    Assisted patient in processing above session content; acknowledged and normalized patient’s thoughts, feelings, and concerns.  Rationalized patient thought process regarding concerns presented at session.  Discussed triggers associated with patient's  anxiety  and depression  Also discussed coping skills for patient to implement such as mindfulness , increasing activity , and positive self talk     Allowed patient to freely discuss issues without interruption or judgment. Provided safe, confidential environment to facilitate the development of positive therapeutic relationship and encourage open, honest communication. Assisted patient in identifying risk factors which would indicate the need for higher level of care including thoughts to harm self or others and/or self-harming behavior and encouraged patient to contact this office, call 911, or present to the nearest emergency room should any of these events occur. Discussed crisis intervention services and means to access. Patient adamantly and convincingly denies current suicidal or homicidal ideation or perceptual disturbance.    Assessment:     Patient appears to maintain relative stability as compared to their baseline.  However, patient persistently struggles with symptoms which continue to cause impairment in important areas of functioning.  As a result, they can be reasonably expected to continue to benefit from treatment and would likely be at increased risk for decompensation otherwise.      PHQ-Score Total:  PHQ-9 Total Score:       KEI-7 Score Total:         Mental Status Exam:   Hygiene:   good  Cooperation:  Cooperative  Eye Contact:  Good  Psychomotor Behavior:  Appropriate  Affect:  Full range  Mood: depressed and anxious  Speech:  Normal  Thought Process:  Goal directed  Thought Content:  Normal  Suicidal:   None  Homicidal: None  Hallucinations:  None  Delusion: None  Memory:  Intact  Orientation:  Grossly Intact  Reliability:  good  Insight:  Good  Judgement:  Good  Impulse Control:  Good  Physical/Medical Issues:  No        Functional Status: Mild impairment     Progress toward goal: Not at goal    Prognosis: Good with continued therapeutic intervention        Plan:    Patient will continue in individual outpatient therapy with focus on improved functioning and coping skills, maintaining stability, and avoiding decompensation and the need for higher level of care.    Patient will adhere to medication regimen as prescribed and report any side effects. Patient will contact this office, call 911 or present to the nearest emergency room should suicidal or homicidal ideations occur. Provide Cognitive Behavioral Therapy and Solution Focused Therapy to improve functioning, maintain stability, and avoid decompensation and the need for higher level of care.     Return in about 2 weeks (around 10/31/2024).      VISIT DIAGNOSIS:    Diagnosis Plan   1. Generalized anxiety disorder        2. MDD (major depressive disorder), recurrent episode, moderate         10:04 EDT       This document has been electronically signed by MARY Brown  October 19, 2024      Part of this note may be an electronic transcription/translation of spoken language to printed text using the Dragon Dictation System.

## 2024-11-25 ENCOUNTER — TELEMEDICINE (OUTPATIENT)
Dept: PSYCHIATRY | Facility: CLINIC | Age: 49
End: 2024-11-25
Payer: COMMERCIAL

## 2024-11-25 DIAGNOSIS — F33.1 MDD (MAJOR DEPRESSIVE DISORDER), RECURRENT EPISODE, MODERATE: ICD-10-CM

## 2024-11-25 DIAGNOSIS — F41.1 GENERALIZED ANXIETY DISORDER: Primary | ICD-10-CM

## 2024-11-25 NOTE — PROGRESS NOTES
Date: November 25, 2024  Time In: 9:06  Time out: 9:36      This provider is located at the [facility]), [address], [place] [address] using a secure MyChart Video Visit through [employer]. Patient is being seen remotely via telehealth at home address in [place] and stated they are in a secure environment for this session. The patient's condition being diagnosed/treated is appropriate for telemedicine. The provider identified herself as well as her credentials. The patient, and/or patients guardian, consent to be seen remotely, and when consent is given they understand that the consent allows for patient identifiable information to be sent to a third party as needed. They may refuse to be seen remotely at any time. The electronic data is encrypted and password protected, and the patient and/or guardian has been advised of the potential risks to privacy not withstanding such measures.     You have chosen to receive care through a telehealth visit.  Do you consent to use a video/audio connection for your medical care today? Yes    NOTE:  Epic platform was experiencing issues and connection was poor until session was terminated due to IT issues.      Pt is located at home    Subjective   Alla Hinson is a 49 y.o. female who presents today fully oriented, appropriately dressed and groomed, and open to communication for follow up appointment.    Chief Complaint:   Chief Complaint   Patient presents with    Anxiety    Depression        History of Present Illness: Rapport was established through conversation and unconditional positive regard. Recent events were discussed and how these events impact Pt's emotional health.       Sleep: Pt reports sleep has remained unchanged since last report. Healthy sleep habits were discussed such as maintaining a schedule, routine, avoiding caffeine, and limiting screen time before bed.    Appetite:  Pt reports  "appetite has been good since last report.  Discussed the importance of hydration and eating a healthy diet for overall and mental health.    Medication compliance: Pt reports medications are being taken daily as prescribed. Discussed the importance of compliance with prescriber's directions and Pt was instructed to report questions/concerns, as well as missed doses or discontinuation of medication by Pt.     Safety Plan in Place: No Pt denies SI/HI/SIB recent or current    Daily Functioning:  Since last report, Pt states symptoms are causing Moderate difficulty in daily functioning.      Content Discussion:   Pt reports life updates since previous session. Pt identified current stressors. Pt acknowledged stressors within and outside of one's control. Pt identified successes and issues with utilizing coping mechanisms.  States work has been very busy and Pt has been traveling some for work.  \"I'm trying to stay positive.\"  Pt states she has been decorating for the holidays and this is helpful with her mood.  States she has a training at work about suicide prevention and dealing with stress and anxiety.  Pt states she identified some traits of negative self talk and focusing on negativity.  Pt encouraged to list her strengths and utilize strengths to mitigate symptoms.    Counselor supported Pt in continued exploration of underlying belief systems which contribute to difficulty in connecting/vulnerability.  Through discussion, Pt identifies themes of preservation and overt emotional and physical reactivity when physical and/or emotional statis is in question, even in low or perceived threatening situations. Counselor supported Pt in identifying past experiences and underlying beliefs which contribute to these feelings and reactions.  Pt was supported and encouraged in processing emotions related to frustrations with the expectations of self and others.  Pt was encouraged to identify cultural and nuclear familial " contexts which contribute to these concerns.  Pt was receptive and engaged in conversation, and Pt reports this was beneficial and applicable to their situation.      Reviewed coping skills and encouraged Pt to continue to practicing coping skills daily when not under distress. Pt was praised for their attempts to decrease symptoms and mitigate activating events.    Clinical Maneuvering/Intervention:  CBT was utilized to encourage Pt to identify maladaptive thoughts and behaviors and replace with more affirming and positive.Pt encouraged to set and maintain appropriate and healthy boundaries with others. Pt was instructed to practice daily using appropriate and specific words to communicate feelings to others.  Motivational interviewing used to encourage Pt to identify strengths which can be utilized in working toward treatment goals. Pt encouraged to practice daily learned skills such as controlled breathing, grounding, and mindfulness. Pt was encouraged to ask for help from support persons to assist them in maintaining stability and alleviate symptoms. Discussed the importance of being one's own mental health advocate and to refrain from seeing the need to ask for help as a weakness. Pt was encouraged to formulate a plan of action to be more proactive in managing stressors and refrain from using reactive and automatic heightened emotional responses.     Solution-focused therapy employed to identify how Pt would like their life to be if they were to make positive changes. Pt encouraged to identify effective coping skills and strengths they can use to continue utilizing those skills. Pt encouraged to discontinue utilizing non-effective coping mechanisms. Pt provided with feedback to highlight achievements and personal and other resources. Encouraged use of SMART goals    Assisted patient in processing above session content; acknowledged and normalized patient’s thoughts, feelings, and concerns.  Rationalized patient  thought process regarding concerns presented at session.  Discussed triggers associated with patient's  anxiety  and depression  Also discussed coping skills for patient to implement such as increasing activity , positive self talk , and re-framing negative thoughts.     Allowed patient to freely discuss issues without interruption or judgment. Provided safe, confidential environment to facilitate the development of positive therapeutic relationship and encourage open, honest communication. Assisted patient in identifying risk factors which would indicate the need for higher level of care including thoughts to harm self or others and/or self-harming behavior and encouraged patient to contact this office, call 911, or present to the nearest emergency room should any of these events occur. Discussed crisis intervention services and means to access. Patient adamantly and convincingly denies current suicidal or homicidal ideation or perceptual disturbance.    Assessment:     Patient appears to maintain relative stability as compared to their baseline.  However, patient persistently struggles with symptoms which continue to cause impairment in important areas of functioning.  As a result, they can be reasonably expected to continue to benefit from treatment and would likely be at increased risk for decompensation otherwise.           Mental Status Exam:   Hygiene:   good  Cooperation:  Cooperative  Eye Contact:  Good  Psychomotor Behavior:  Appropriate  Affect:  Full range  Mood: depressed and anxious  Speech:  Normal  Thought Process:  Goal directed  Thought Content:  Normal  Suicidal:  None  Homicidal: None  Hallucinations:  None  Delusion: None  Memory:  Intact  Orientation:  Grossly Intact  Reliability:  good  Insight:  Good  Judgement:  Good  Impulse Control:  Good  Physical/Medical Issues:  No        Functional Status: Moderate impairment     Progress toward goal: Not at goal    Prognosis: Good with continued  therapeutic intervention        Plan:    Patient will continue in individual outpatient therapy with focus on improved functioning and coping skills, maintaining stability, and avoiding decompensation and the need for higher level of care.    Patient will adhere to medication regimen as prescribed and report any side effects. Patient will contact this office, call 911 or present to the nearest emergency room should suicidal or homicidal ideations occur. Provide Cognitive Behavioral Therapy and Solution Focused Therapy to improve functioning, maintain stability, and avoid decompensation and the need for higher level of care.     Return in about 3 weeks (around 12/16/2024).      VISIT DIAGNOSIS:    Diagnosis Plan   1. Generalized anxiety disorder        2. MDD (major depressive disorder), recurrent episode, moderate         09:06 EST       This document has been electronically signed by MARY Brown  November 25, 2024      Part of this note may be an electronic transcription/translation of spoken language to printed text using the Dragon Dictation System.

## 2024-12-06 ENCOUNTER — TELEMEDICINE (OUTPATIENT)
Dept: PSYCHIATRY | Facility: CLINIC | Age: 49
End: 2024-12-06
Payer: COMMERCIAL

## 2024-12-06 DIAGNOSIS — F41.1 GENERALIZED ANXIETY DISORDER: Primary | ICD-10-CM

## 2024-12-06 DIAGNOSIS — F33.1 MDD (MAJOR DEPRESSIVE DISORDER), RECURRENT EPISODE, MODERATE: ICD-10-CM

## 2024-12-06 NOTE — PROGRESS NOTES
Date: December 8, 2024  Time In: 9:05  Time out: 10:03      This provider is located at the [facility] ([facility]), [address], [place] [address] using a secure [employer] Video Visit through [employer]. Patient is being seen remotely via telehealth at home address in [place] and stated they are in a secure environment for this session. The patient's condition being diagnosed/treated is appropriate for telemedicine. The provider identified herself as well as her credentials. The patient, and/or patients guardian, consent to be seen remotely, and when consent is given they understand that the consent allows for patient identifiable information to be sent to a third party as needed. They may refuse to be seen remotely at any time. The electronic data is encrypted and password protected, and the patient and/or guardian has been advised of the potential risks to privacy not withstanding such measures.     You have chosen to receive care through a telehealth visit.  Do you consent to use a video/audio connection for your medical care today? Yes      Pt is located at Home    Subjective   Alla Hinson is a 49 y.o. female who presents today fully oriented, appropriately dressed and groomed, and open to communication for follow up appointment.    Chief Complaint:   Chief Complaint   Patient presents with    Anxiety    Depression        History of Present Illness: Rapport was established through conversation and unconditional positive regard. Recent events were discussed and how these events impact Pt's emotional health.   Pt reports successful use of learned coping skills since last report.  Pt reports continuation of symptoms and states the intensity and duration of symptoms has remained unchanged since last report. Pt rates anxiety at 5/10 and depression at 4/10.  Pt states anx is exacerbated by children and family's  busy schedules and having daughter home from  "college.     Sleep: Pt reports sleep has remained unchanged since last report. Healthy sleep habits were discussed such as maintaining a schedule, routine, avoiding caffeine, and limiting screen time before bed.    Appetite:  Pt reports appetite has been good since last report.  Discussed the importance of hydration and eating a healthy diet for overall and mental health.    Medication compliance: Pt reports medications are being taken daily as prescribed. Discussed the importance of compliance with prescriber's directions and Pt was instructed to report questions/concerns, as well as missed doses or discontinuation of medication by Pt.     Safety Plan in Place: No Pt denies SI/HI/SIB recent or current    Daily Functioning:  Since last report, Pt states symptoms are causing Mild difficulty in daily functioning.      Content Discussion:   Pt reports life updates since previous session. Pt identified current stressors. Pt acknowledged stressors within and outside of one's control. Pt identified successes and issues with utilizing coping mechanisms.  Pt had a good Thanksgiving holiday with family and they went to [place] for a dance competition for her youngest. States she was feeling down yesterday and went thrifting and found some good deals, and this \"Put me in a better frame of mind.\"  Discussed the need to have hobbies and pleasurable activities to mitigate anxiety and improve mood.      Counselor supported Pt in continued exploration of underlying belief systems which contribute to difficulty in connecting/vulnerability.  Through discussion, Pt identifies themes of preservation and overt emotional and physical reactivity when physical and/or emotional statis is in question, even in low or perceived threatening situations. Counselor supported Pt in identifying past experiences and underlying beliefs which contribute to these feelings and reactions.  Pt was supported and encouraged in processing emotions related to " frustrations with the expectations of self and others.  Pt was encouraged to identify cultural and nuclear familial contexts which contribute to these concerns.  Pt was receptive and engaged in conversation, and Pt reports this was beneficial and applicable to their situation.      Reviewed coping skills and encouraged Pt to continue to practicing coping skills daily when not under distress. Pt was praised for their attempts to decrease symptoms and mitigate activating events.    Clinical Maneuvering/Intervention:  CBT was utilized to encourage Pt to identify maladaptive thoughts and behaviors and replace with more affirming and positive.Pt encouraged to set and maintain appropriate and healthy boundaries with others. Pt was instructed to practice daily using appropriate and specific words to communicate feelings to others.  Motivational interviewing used to encourage Pt to identify strengths which can be utilized in working toward treatment goals. Pt encouraged to practice daily learned skills such as controlled breathing, grounding, and mindfulness. Pt was encouraged to ask for help from support persons to assist them in maintaining stability and alleviate symptoms. Discussed the importance of being one's own mental health advocate and to refrain from seeing the need to ask for help as a weakness. Pt was encouraged to formulate a plan of action to be more proactive in managing stressors and refrain from using reactive and automatic heightened emotional responses.     Solution-focused therapy employed to identify how Pt would like their life to be if they were to make positive changes. Pt encouraged to identify effective coping skills and strengths they can use to continue utilizing those skills. Pt encouraged to discontinue utilizing non-effective coping mechanisms. Pt provided with feedback to highlight achievements and personal and other resources. Encouraged use of SMART goals    Assisted patient in processing  above session content; acknowledged and normalized patient’s thoughts, feelings, and concerns.  Rationalized patient thought process regarding concerns presented at session.  Discussed triggers associated with patient's  anxiety  Also discussed coping skills for patient to implement such as self care  and positive self talk     Allowed patient to freely discuss issues without interruption or judgment. Provided safe, confidential environment to facilitate the development of positive therapeutic relationship and encourage open, honest communication. Assisted patient in identifying risk factors which would indicate the need for higher level of care including thoughts to harm self or others and/or self-harming behavior and encouraged patient to contact this office, call 911, or present to the nearest emergency room should any of these events occur. Discussed crisis intervention services and means to access. Patient adamantly and convincingly denies current suicidal or homicidal ideation or perceptual disturbance.    Assessment:     Patient appears to maintain relative stability as compared to their baseline.  However, patient persistently struggles with symptoms which continue to cause impairment in important areas of functioning.  As a result, they can be reasonably expected to continue to benefit from treatment and would likely be at increased risk for decompensation otherwise.      Mental Status Exam:   Hygiene:   good  Cooperation:  Cooperative  Eye Contact:  Good  Psychomotor Behavior:  Appropriate  Affect:  Full range  Mood: normal  Speech:  Normal  Thought Process:  Goal directed  Thought Content:  Normal  Suicidal:  None  Homicidal: None  Hallucinations:  None  Delusion: None  Memory:  Intact  Orientation:  Grossly Intact  Reliability:  good  Insight:  Good  Judgement:  Good  Impulse Control:  Good  Physical/Medical Issues:  No        Functional Status: Mild impairment     Progress toward goal: Not at  goal    Prognosis: Good with continued therapeutic intervention        Plan:    Patient will continue in individual outpatient therapy with focus on improved functioning and coping skills, maintaining stability, and avoiding decompensation and the need for higher level of care.    Patient will adhere to medication regimen as prescribed and report any side effects. Patient will contact this office, call 911 or present to the nearest emergency room should suicidal or homicidal ideations occur. Provide Cognitive Behavioral Therapy and Solution Focused Therapy to improve functioning, maintain stability, and avoid decompensation and the need for higher level of care.     Return in about 2 weeks (around 12/20/2024).      VISIT DIAGNOSIS:    Diagnosis Plan   1. Generalized anxiety disorder        2. MDD (major depressive disorder), recurrent episode, moderate         09:05 EST       This document has been electronically signed by MARY Brown  December 8, 2024      Part of this note may be an electronic transcription/translation of spoken language to printed text using the Dragon Dictation System.

## 2025-02-07 ENCOUNTER — TELEMEDICINE (OUTPATIENT)
Dept: PSYCHIATRY | Facility: CLINIC | Age: 50
End: 2025-02-07
Payer: COMMERCIAL

## 2025-02-07 DIAGNOSIS — F41.1 GENERALIZED ANXIETY DISORDER: Primary | ICD-10-CM

## 2025-02-07 DIAGNOSIS — F33.1 MDD (MAJOR DEPRESSIVE DISORDER), RECURRENT EPISODE, MODERATE: ICD-10-CM

## 2025-02-07 PROCEDURE — 90837 PSYTX W PT 60 MINUTES: CPT | Performed by: COUNSELOR

## 2025-02-07 NOTE — PROGRESS NOTES
Date: February 10, 2025  Time In: 9:06  Time out: 10:02      This provider is located at the [facility]through [facility]), [address], [place] [address] using a secure MAG [employer] Video Visit through [employer]. Patient is being seen remotely via telehealth, and they are in a secure environment for this session. The patient's condition being diagnosed/treated is appropriate for telemedicine. The provider identified herself as well as her credentials. The patient, and/or patients guardian, consent to be seen remotely, and when consent is given they understand that the consent allows for patient identifiable information to be sent to a third party as needed. They may refuse to be seen remotely at any time. The electronic data is encrypted and password protected, and the patient and/or guardian has been advised of the potential risks to privacy not withstanding such measures.     Mode of Visit: Video  Location of patient: Home  Location of provider: Provider home  You have chosen to receive care through a telehealth visit.  The patient has signed the video visit consent form.  The visit included audio and video interaction. No technical issues occurred during this visit    Subjective   Alla Hinson is a 49 y.o. female who presents today fully oriented, appropriately dressed and groomed, and open to communication for follow up appointment.    Chief Complaint:   Chief Complaint   Patient presents with    Anxiety    Depression        History of Present Illness: Rapport was established through conversation and unconditional positive regard. Recent events were discussed and how these events impact Pt's emotional health.   Pt reports successful use of learned coping skills since last report.  Pt reports continuation of symptoms and states the intensity and duration of symptoms has worsened since last report. Pt rates anxiety at 7/10 and depression at 3/10.     Sleep: Pt  "reports sleep has remained unchanged since last report. Healthy sleep habits were discussed such as maintaining a schedule, routine, avoiding caffeine, and limiting screen time before bed.  Sleep has been interrupted and non-restorative at times.  \"I have a hard time putting my phone down.\"     Appetite:  Pt reports appetite has been good since last report.  Discussed the importance of hydration and eating a healthy diet for overall and mental health.    Medication compliance: Pt reports medications are being taken daily as prescribed. Discussed the importance of compliance with prescriber's directions and Pt was instructed to report questions/concerns, as well as missed doses or discontinuation of medication by Pt.     Safety Plan in Place: No Pt denies SI/HI/SIB recent or current    Daily Functioning:  Since last report, Pt states symptoms are causing Mild difficulty in daily functioning.      Content Discussion:   Pt reports life updates since previous session. Pt identified current stressors. Pt acknowledged stressors within and outside of one's control. Pt identified successes and issues with utilizing coping mechanisms.  States she has been monitoring news and this is interrupting her sleep schedule.  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  Pt states her job has always been a source of stress for her, and with two active children she and  feel this is a good move for their family.  Pt states she has vacillated back and forth over this decision, and this has increased her anxiety. Pt states she is working on finding a balance of being informed of current events and setting a boundary on how much media input she can handle.  Discussed the need to prioritize her mental and physical wellbeing.      Counselor supported Pt in continued exploration of underlying belief systems which contribute to difficulty in connecting/vulnerability.  " Through discussion, Pt identifies themes of preservation and overt emotional and physical reactivity when physical and/or emotional statis is in question, even in low or perceived threatening situations. Counselor supported Pt in identifying past experiences and underlying beliefs which contribute to these feelings and reactions.  Pt was supported and encouraged in processing emotions related to frustrations with the expectations of self and others.  Pt was encouraged to identify cultural and nuclear familial contexts which contribute to these concerns.  Pt was receptive and engaged in conversation, and Pt reports this was beneficial and applicable to their situation.      Reviewed coping skills and encouraged Pt to continue to practicing coping skills daily when not under distress. Pt was praised for their attempts to decrease symptoms and mitigate activating events.    Clinical Maneuvering/Intervention:  CBT was utilized to encourage Pt to identify maladaptive thoughts and behaviors and replace with more affirming and positive.Pt encouraged to set and maintain appropriate and healthy boundaries with others. Pt was instructed to practice daily using appropriate and specific words to communicate feelings to others.  Motivational interviewing used to encourage Pt to identify strengths which can be utilized in working toward treatment goals. Pt encouraged to practice daily learned skills such as controlled breathing, grounding, and mindfulness. Pt was encouraged to ask for help from support persons to assist them in maintaining stability and alleviate symptoms. Discussed the importance of being one's own mental health advocate and to refrain from seeing the need to ask for help as a weakness. Pt was encouraged to formulate a plan of action to be more proactive in managing stressors and refrain from using reactive and automatic heightened emotional responses.     Solution-focused therapy employed to identify how Pt  would like their life to be if they were to make positive changes. Pt encouraged to identify effective coping skills and strengths they can use to continue utilizing those skills. Pt encouraged to discontinue utilizing non-effective coping mechanisms. Pt provided with feedback to highlight achievements and personal and other resources. Encouraged use of SMART goals    Assisted patient in processing above session content; acknowledged and normalized patient’s thoughts, feelings, and concerns.  Rationalized patient thought process regarding concerns presented at session.  Discussed triggers associated with patient's  anxiety  and depression  Also discussed coping skills for patient to implement such as increasing activity , self care , and positive self talk     Allowed patient to freely discuss issues without interruption or judgment. Provided safe, confidential environment to facilitate the development of positive therapeutic relationship and encourage open, honest communication. Assisted patient in identifying risk factors which would indicate the need for higher level of care including thoughts to harm self or others and/or self-harming behavior and encouraged patient to contact this office, call 911, or present to the nearest emergency room should any of these events occur. Discussed crisis intervention services and means to access. Patient adamantly and convincingly denies current suicidal or homicidal ideation or perceptual disturbance.    Assessment:     Patient appears to maintain relative stability as compared to their baseline.  However, patient persistently struggles with symptoms which continue to cause impairment in important areas of functioning.  As a result, they can be reasonably expected to continue to benefit from treatment and would likely be at increased risk for decompensation otherwise.        Mental Status Exam:   Hygiene:   good  Cooperation:  Cooperative  Eye Contact:  Good  Psychomotor  "Behavior:  Appropriate  Affect:  Full range  \"a little worried\"  Mood: anxious  Speech:  Normal  Thought Process:  Goal directed  Thought Content:  Normal  Suicidal:  None  Homicidal: None  Hallucinations:  None  Delusion: None  Memory:  Intact  Orientation:  Grossly Intact  Reliability:  good  Insight:  Good  Judgement:  Good  Impulse Control:  Good  Physical/Medical Issues:  No        Functional Status: Mild impairment     Progress toward goal: Not at goal    Prognosis: Good with continued therapeutic intervention        Plan:    Patient will continue in individual outpatient therapy with focus on improved functioning and coping skills, maintaining stability, and avoiding decompensation and the need for higher level of care.    Patient will adhere to medication regimen as prescribed and report any side effects. Patient will contact this office, call 911 or present to the nearest emergency room should suicidal or homicidal ideations occur. Provide Cognitive Behavioral Therapy and Solution Focused Therapy to improve functioning, maintain stability, and avoid decompensation and the need for higher level of care.     Return in about 2 weeks (around 2/21/2025).      VISIT DIAGNOSIS:    Diagnosis Plan   1. Generalized anxiety disorder        2. MDD (major depressive disorder), recurrent episode, moderate         09:02 EST       This document has been electronically signed by MARY Brown  February 10, 2025      Part of this note may be an electronic transcription/translation of spoken language to printed text using the Dragon Dictation System.  "

## 2025-02-10 DIAGNOSIS — F41.9 ANXIETY: ICD-10-CM

## 2025-02-10 DIAGNOSIS — I10 ESSENTIAL HYPERTENSION: ICD-10-CM

## 2025-02-11 RX ORDER — ATENOLOL 25 MG/1
25 TABLET ORAL DAILY
Qty: 90 TABLET | Refills: 2 | OUTPATIENT
Start: 2025-02-11

## 2025-02-14 DIAGNOSIS — I10 ESSENTIAL HYPERTENSION: ICD-10-CM

## 2025-02-14 DIAGNOSIS — F41.9 ANXIETY: ICD-10-CM

## 2025-02-18 DIAGNOSIS — I10 ESSENTIAL HYPERTENSION: ICD-10-CM

## 2025-02-18 DIAGNOSIS — F41.9 ANXIETY: ICD-10-CM

## 2025-02-18 RX ORDER — ATENOLOL 25 MG/1
25 TABLET ORAL DAILY
Qty: 90 TABLET | Refills: 2 | Status: CANCELLED | OUTPATIENT
Start: 2025-02-18

## 2025-02-18 RX ORDER — TRIAMCINOLONE ACETONIDE 1 MG/G
1 CREAM TOPICAL 2 TIMES DAILY
Qty: 45 G | Refills: 0 | Status: SHIPPED | OUTPATIENT
Start: 2025-02-18

## 2025-02-18 RX ORDER — ATENOLOL 25 MG/1
25 TABLET ORAL DAILY
Qty: 90 TABLET | Refills: 2 | Status: SHIPPED | OUTPATIENT
Start: 2025-02-18

## 2025-02-28 ENCOUNTER — TELEMEDICINE (OUTPATIENT)
Dept: PSYCHIATRY | Facility: CLINIC | Age: 50
End: 2025-02-28
Payer: COMMERCIAL

## 2025-02-28 ENCOUNTER — OFFICE VISIT (OUTPATIENT)
Dept: INTERNAL MEDICINE | Facility: CLINIC | Age: 50
End: 2025-02-28
Payer: COMMERCIAL

## 2025-02-28 VITALS
WEIGHT: 170.7 LBS | HEART RATE: 64 BPM | DIASTOLIC BLOOD PRESSURE: 78 MMHG | BODY MASS INDEX: 26.79 KG/M2 | TEMPERATURE: 98.4 F | HEIGHT: 67 IN | OXYGEN SATURATION: 98 % | SYSTOLIC BLOOD PRESSURE: 136 MMHG

## 2025-02-28 DIAGNOSIS — F51.01 PRIMARY INSOMNIA: Primary | ICD-10-CM

## 2025-02-28 DIAGNOSIS — Z23 NEED FOR VACCINATION: ICD-10-CM

## 2025-02-28 DIAGNOSIS — I10 ESSENTIAL HYPERTENSION: ICD-10-CM

## 2025-02-28 DIAGNOSIS — F41.9 ANXIETY: ICD-10-CM

## 2025-02-28 DIAGNOSIS — Z00.00 HEALTHCARE MAINTENANCE: ICD-10-CM

## 2025-02-28 DIAGNOSIS — F41.1 GENERALIZED ANXIETY DISORDER: Primary | ICD-10-CM

## 2025-02-28 RX ORDER — ALPRAZOLAM 0.25 MG
0.25 TABLET ORAL DAILY PRN
Qty: 30 TABLET | Refills: 0 | Status: SHIPPED | OUTPATIENT
Start: 2025-02-28

## 2025-02-28 RX ORDER — AMLODIPINE BESYLATE 5 MG/1
5 TABLET ORAL DAILY
Qty: 90 TABLET | Refills: 2 | Status: SHIPPED | OUTPATIENT
Start: 2025-02-28

## 2025-02-28 RX ORDER — ATENOLOL 25 MG/1
25 TABLET ORAL DAILY
Qty: 90 TABLET | Refills: 2 | Status: SHIPPED | OUTPATIENT
Start: 2025-02-28

## 2025-02-28 NOTE — PROGRESS NOTES
Subjective   Alla Hinson is a 49 y.o. female.     Chief Complaint   Patient presents with    Annual Exam         History of Present Illness   Alla Hinson 49 y.o. female who presents for an Annual Wellness Visit.  she has a history of   Patient Active Problem List   Diagnosis    Essential hypertension    Anxiety    Eczema    Seasonal allergic rhinitis due to pollen    Healthcare maintenance    MDD (major depressive disorder), recurrent episode, moderate    Generalized anxiety disorder with panic attacks    Side effect of medication    Stress at work    Social anxiety disorder    Menopausal symptoms (focus/concentration/hot flashes)    Primary insomnia    Acute streptococcal pharyngitis   .  she has been feeling well.   I  reviewed health maintenance with her as part of my preventative care plan.  Has regular dental and eye exams has weaned herself off Viibryd would like occasional dosing alprazolam she is no longer seeing Rolando Menendez  The following portions of the patient's history were reviewed and updated as appropriate: allergies, current medications, past family history, past medical history, past social history, past surgical history, and problem list.    Review of Systems   Constitutional:  Negative for appetite change, fever and unexpected weight change.   HENT:  Negative for ear pain, facial swelling and sore throat.    Eyes:  Negative for pain and visual disturbance.   Respiratory:  Negative for chest tightness, shortness of breath and wheezing.    Cardiovascular:  Negative for chest pain and palpitations.   Gastrointestinal:  Negative for abdominal pain and blood in stool.   Endocrine: Negative.    Genitourinary:  Negative for difficulty urinating and hematuria.   Musculoskeletal:  Negative for joint swelling.   Neurological:  Negative for tremors, seizures and syncope.   Hematological:  Negative for adenopathy.   Psychiatric/Behavioral: Negative.         Objective   Physical Exam  Vitals  Uncontrolled, continue current medications and add actos.   if not effective or side effects then will try long acting dailly insulin and nursing note reviewed.   Constitutional:       Appearance: Normal appearance. She is well-developed. She is not diaphoretic.   HENT:      Head: Normocephalic and atraumatic.      Right Ear: Tympanic membrane, ear canal and external ear normal.      Left Ear: Tympanic membrane, ear canal and external ear normal.   Eyes:      General: Lids are normal. No scleral icterus.     Extraocular Movements: Extraocular movements intact.      Conjunctiva/sclera: Conjunctivae normal.   Neck:      Thyroid: No thyroid mass or thyromegaly.      Vascular: No carotid bruit or JVD.   Cardiovascular:      Rate and Rhythm: Normal rate and regular rhythm.      Pulses: Normal pulses.           Radial pulses are 2+ on the right side and 2+ on the left side.      Heart sounds: Normal heart sounds. No murmur heard.  Pulmonary:      Effort: Pulmonary effort is normal. No respiratory distress.      Breath sounds: Normal breath sounds.   Musculoskeletal:      Cervical back: Normal range of motion.      Right lower leg: No edema.      Left lower leg: No edema.   Skin:     General: Skin is warm and dry.      Coloration: Skin is not pale.      Findings: No erythema or rash.   Neurological:      General: No focal deficit present.      Mental Status: She is alert and oriented to person, place, and time.      Sensory: No sensory deficit.      Deep Tendon Reflexes: Reflexes are normal and symmetric.   Psychiatric:         Mood and Affect: Mood normal.         Behavior: Behavior normal. Behavior is cooperative.         Thought Content: Thought content normal.         Judgment: Judgment normal.         Assessment & Plan   Diagnoses and all orders for this visit:    1. Primary insomnia (Primary)  -     Suvorexant 20 MG tablet; Take 20 mg by mouth At Night As Needed (sleep).  Dispense: 30 tablet; Refill: 1    2. Need for vaccination  -     Pneumococcal Conjugate Vaccine 20-Valent All    3. Essential hypertension  -     amLODIPine (NORVASC) 5 MG tablet;  Take 1 tablet by mouth Daily.  Dispense: 90 tablet; Refill: 2  -     atenolol (TENORMIN) 25 MG tablet; Take 1 tablet by mouth Daily.  Dispense: 90 tablet; Refill: 2  -     Comprehensive Metabolic Panel  -     Lipid Panel  -     TSH    4. Anxiety  -     atenolol (TENORMIN) 25 MG tablet; Take 1 tablet by mouth Daily.  Dispense: 90 tablet; Refill: 2  -     ALPRAZolam (XANAX) 0.25 MG tablet; Take 1 tablet by mouth Daily As Needed for Anxiety.  Dispense: 30 tablet; Refill: 0    5. Healthcare maintenance      Continue healthy lifestyle calorie appropriate diet and regular physical activity  Education friend regarding prevention of serious illness with immunizations  Education provided regarding early detection screening patient is current with colorectal cancer screening mammograms  Continue stable treatment for hypertension anxiety and insomnia follow-up otherwise as needed or 6 months

## 2025-02-28 NOTE — PROGRESS NOTES
Date: March 2, 2025  Time In: 9:02  Time out: 9:55      This provider is located at the [facility] (through [facility]), [address], [place] [address] using a secure GoF[employer] Video Visit through [employer]. Patient is being seen remotely via telehealth, and they are in a secure environment for this session. The patient's condition being diagnosed/treated is appropriate for telemedicine. The provider identified herself as well as her credentials. The patient, and/or patients guardian, consent to be seen remotely, and when consent is given they understand that the consent allows for patient identifiable information to be sent to a third party as needed. They may refuse to be seen remotely at any time. The electronic data is encrypted and password protected, and the patient and/or guardian has been advised of the potential risks to privacy not withstanding such measures.     Mode of Visit: Video  Location of patient: home  Location of provider: Provider home  You have chosen to receive care through a telehealth visit.  The patient has signed the video visit consent form.  The visit included audio and video interaction. No technical issues occurred during this visit    Subjective   Alla Hinson is a 49 y.o. female who presents today fully oriented, appropriately dressed and groomed, and open to communication for follow up appointment.    Chief Complaint: No chief complaint on file.       History of Present Illness: Rapport was established through conversation and unconditional positive regard. Recent events were discussed and how these events impact Pt's emotional health.   Pt reports successful use of learned coping skills since last report.  Pt reports continuation of symptoms and states the intensity and duration of symptoms has remained unchanged since last report. Pt rates anxiety at 6/10 and depression at 3/10.     Sleep: Pt reports sleep has remained unchanged  "since last report. Healthy sleep habits were discussed such as maintaining a schedule, routine, avoiding caffeine, and limiting screen time before bed.    Appetite:  Pt reports appetite has been good since last report.  Discussed the importance of hydration and eating a healthy diet for overall and mental health.    Medication compliance: Pt reports medications are being taken daily as prescribed. Discussed the importance of compliance with prescriber's directions and Pt was instructed to report questions/concerns, as well as missed doses or discontinuation of medication by Pt.     Safety Plan in Place: No Pt denies SI/HI/SIB recent or current    Daily Functioning:  Since last report, Pt states symptoms are causing Moderate difficulty in daily functioning.      Content Discussion:   Pt reports life updates since previous session. Pt identified current stressors. Pt acknowledged stressors within and outside of one's control. Pt identified successes and issues with utilizing coping mechanisms.  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  States she responded to an email that she wants to take a severance offer.  Pt states she talked to her supervisor and has not heard anything back yet.  States she is continuing to work as normal for now. \"I feel kind of bad\" about leaving her co workers. Discussed re-framing and to look at it like she is helping someone else who may have lost their job due to cuts.  \"I hope that it frees up a space for someone else.\" States she and  sat down with a  spreadsheet and made a financial plan for Pt to be more comfortable with taking a hiatus to catch up on some household daily tasks.  States she is going to tackles some home improvement tasks.  Reports it is helpful having a goal.  States she wants to take her time off work to learn some home improvement and skills such a gardening and may home food preservation.      Counselor supported Pt in continued exploration of " underlying belief systems which contribute to difficulty in connecting/vulnerability.  Through discussion, Pt identifies themes of preservation and overt emotional and physical reactivity when physical and/or emotional statis is in question, even in low or perceived threatening situations. Counselor supported Pt in identifying past experiences and underlying beliefs which contribute to these feelings and reactions.  Pt was supported and encouraged in processing emotions related to frustrations with the expectations of self and others.  Pt was encouraged to identify cultural and nuclear familial contexts which contribute to these concerns.  Pt was receptive and engaged in conversation, and Pt reports this was beneficial and applicable to their situation.      Reviewed coping skills and encouraged Pt to continue to practicing coping skills daily when not under distress. Pt was praised for their attempts to decrease symptoms and mitigate activating events.    Clinical Maneuvering/Intervention:  CBT was utilized to encourage Pt to identify maladaptive thoughts and behaviors and replace with more affirming and positive.Pt encouraged to set and maintain appropriate and healthy boundaries with others. Pt was instructed to practice daily using appropriate and specific words to communicate feelings to others.  Motivational interviewing used to encourage Pt to identify strengths which can be utilized in working toward treatment goals. Pt encouraged to practice daily learned skills such as controlled breathing, grounding, and mindfulness. Pt was encouraged to ask for help from support persons to assist them in maintaining stability and alleviate symptoms. Discussed the importance of being one's own mental health advocate and to refrain from seeing the need to ask for help as a weakness. Pt was encouraged to formulate a plan of action to be more proactive in managing stressors and refrain from using reactive and automatic  heightened emotional responses.     Solution-focused therapy employed to identify how Pt would like their life to be if they were to make positive changes. Pt encouraged to identify effective coping skills and strengths they can use to continue utilizing those skills. Pt encouraged to discontinue utilizing non-effective coping mechanisms. Pt provided with feedback to highlight achievements and personal and other resources. Encouraged use of SMART goals    Assisted patient in processing above session content; acknowledged and normalized patient’s thoughts, feelings, and concerns.  Rationalized patient thought process regarding concerns presented at session.  Discussed triggers associated with patient's  anxiety  Also discussed coping skills for patient to implement such as mindfulness , increasing activity , and positive self talk     Allowed patient to freely discuss issues without interruption or judgment. Provided safe, confidential environment to facilitate the development of positive therapeutic relationship and encourage open, honest communication. Assisted patient in identifying risk factors which would indicate the need for higher level of care including thoughts to harm self or others and/or self-harming behavior and encouraged patient to contact this office, call 911, or present to the nearest emergency room should any of these events occur. Discussed crisis intervention services and means to access. Patient adamantly and convincingly denies current suicidal or homicidal ideation or perceptual disturbance.    Assessment:     Patient appears to maintain relative stability as compared to their baseline.  However, patient persistently struggles with symptoms which continue to cause impairment in important areas of functioning.  As a result, they can be reasonably expected to continue to benefit from treatment and would likely be at increased risk for decompensation otherwise.        Mental Status Exam:    Hygiene:   good  Cooperation:  Cooperative  Eye Contact:  Good  Psychomotor Behavior:  Appropriate  Affect:  Full range  Mood: anxious  Speech:  Normal  Thought Process:  Goal directed  Thought Content:  Normal  Suicidal:  None  Homicidal: None  Hallucinations:  None  Delusion: None  Memory:  Intact  Orientation:  Grossly Intact  Reliability:  good  Insight:  Good  Judgement:  Good  Impulse Control:  Good  Physical/Medical Issues:  No        Functional Status: Mild impairment     Progress toward goal: Not at goal    Prognosis: Good with continued therapeutic intervention        Plan:    Patient will continue in individual outpatient therapy with focus on improved functioning and coping skills, maintaining stability, and avoiding decompensation and the need for higher level of care.    Patient will adhere to medication regimen as prescribed and report any side effects. Patient will contact this office, call 911 or present to the nearest emergency room should suicidal or homicidal ideations occur. Provide Cognitive Behavioral Therapy and Solution Focused Therapy to improve functioning, maintain stability, and avoid decompensation and the need for higher level of care.     Return in about 2 weeks (around 3/14/2025).      VISIT DIAGNOSIS:    Diagnosis Plan   1. Generalized anxiety disorder         09:02 EST       This document has been electronically signed by MARY Brown  March 2, 2025      Part of this note may be an electronic transcription/translation of spoken language to printed text using the Dragon Dictation System.

## 2025-03-01 LAB
ALBUMIN SERPL-MCNC: 4.8 G/DL (ref 3.9–4.9)
ALP SERPL-CCNC: 57 IU/L (ref 44–121)
ALT SERPL-CCNC: 27 IU/L (ref 0–32)
AST SERPL-CCNC: 22 IU/L (ref 0–40)
BILIRUB SERPL-MCNC: 0.5 MG/DL (ref 0–1.2)
BUN SERPL-MCNC: 12 MG/DL (ref 6–24)
BUN/CREAT SERPL: 12 (ref 9–23)
CALCIUM SERPL-MCNC: 9.9 MG/DL (ref 8.7–10.2)
CHLORIDE SERPL-SCNC: 101 MMOL/L (ref 96–106)
CHOLEST SERPL-MCNC: 248 MG/DL (ref 100–199)
CO2 SERPL-SCNC: 24 MMOL/L (ref 20–29)
CREAT SERPL-MCNC: 0.97 MG/DL (ref 0.57–1)
EGFRCR SERPLBLD CKD-EPI 2021: 71 ML/MIN/1.73
GLOBULIN SER CALC-MCNC: 2.4 G/DL (ref 1.5–4.5)
GLUCOSE SERPL-MCNC: 81 MG/DL (ref 70–99)
HDLC SERPL-MCNC: 77 MG/DL
LDLC SERPL CALC-MCNC: 157 MG/DL (ref 0–99)
POTASSIUM SERPL-SCNC: 3.8 MMOL/L (ref 3.5–5.2)
PROT SERPL-MCNC: 7.2 G/DL (ref 6–8.5)
SODIUM SERPL-SCNC: 141 MMOL/L (ref 134–144)
TRIGL SERPL-MCNC: 81 MG/DL (ref 0–149)
TSH SERPL DL<=0.005 MIU/L-ACNC: 1.08 UIU/ML (ref 0.45–4.5)
VLDLC SERPL CALC-MCNC: 14 MG/DL (ref 5–40)

## 2025-03-07 ENCOUNTER — TELEPHONE (OUTPATIENT)
Dept: INTERNAL MEDICINE | Facility: CLINIC | Age: 50
End: 2025-03-07
Payer: COMMERCIAL

## 2025-03-07 NOTE — TELEPHONE ENCOUNTER
----- Message from Neeraj Rodriguez sent at 3/7/2025  7:42 AM EST -----  Please call the patient regarding her abnormal result.  Cholesterol with elevated good cholesterol is slightly elevated bad cholesterol recommend reducing fats in diet incorporate more plant-based options recheck fasting lipid profile 1 year

## 2025-03-21 ENCOUNTER — TELEMEDICINE (OUTPATIENT)
Dept: PSYCHIATRY | Facility: CLINIC | Age: 50
End: 2025-03-21
Payer: COMMERCIAL

## 2025-03-21 DIAGNOSIS — F33.1 MDD (MAJOR DEPRESSIVE DISORDER), RECURRENT EPISODE, MODERATE: ICD-10-CM

## 2025-03-21 DIAGNOSIS — F41.1 GENERALIZED ANXIETY DISORDER: Primary | ICD-10-CM

## 2025-03-21 NOTE — PROGRESS NOTES
Date: March 21, 2025  Time In: 10:04  Time out: 10:56      This provider is located at the [facility]), [address], [place] [address] using a secure [employer] Video Visit through [employer]. Patient is being seen remotely via telehealth, and they are in a secure environment for this session. The patient's condition being diagnosed/treated is appropriate for telemedicine. The provider identified herself as well as her credentials. The patient, and/or patients guardian, consent to be seen remotely, and when consent is given they understand that the consent allows for patient identifiable information to be sent to a third party as needed. They may refuse to be seen remotely at any time. The electronic data is encrypted and password protected, and the patient and/or guardian has been advised of the potential risks to privacy not withstanding such measures.     Mode of Visit: Video  Location of patient: home  Location of provider: Provider home  You have chosen to receive care through a telehealth visit.  The patient has signed the video visit consent form.  The visit included audio and video interaction. No technical issues occurred during this visit    Subjective   Alla Hinson is a 49 y.o. female who presents today fully oriented, appropriately dressed and groomed, and open to communication for follow up appointment.    Chief Complaint:   Chief Complaint   Patient presents with    Anxiety    Depression        History of Present Illness: Rapport was established through conversation and unconditional positive regard. Recent events were discussed and how these events impact Pt's emotional health.   Pt reports successful use of learned coping skills since last report.  Pt reports continuation of symptoms and states the intensity and duration of symptoms has improved since last report. Pt rates anxiety at 4/10 and depression at 2/10.     Sleep: Pt reports  "sleep has remained unchanged since last report. Healthy sleep habits were discussed such as maintaining a schedule, routine, avoiding caffeine, and limiting screen time before bed.    Appetite:  Pt reports appetite has been good since last report.  Discussed the importance of hydration and eating a healthy diet for overall and mental health.    Medication compliance: Pt reports medications are being taken daily as prescribed. Discussed the importance of compliance with prescriber's directions and Pt was instructed to report questions/concerns, as well as missed doses or discontinuation of medication by Pt.     Safety Plan in Place: No Pt denies SI/HI/SIB recent or current    Daily Functioning:  Since last report, Pt states symptoms are causing Mild difficulty in daily functioning.      Content Discussion:   Pt reports life updates since previous session. Pt identified current stressors. Pt acknowledged stressors within and outside of one's control. Pt identified successes and issues with utilizing coping mechanisms.  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  States her last day is the end of this month.  Pt states she has a good-bye email ready to send to her co-workers.  Pt states she has been discreetly cleaning out her office.  Pt states she has \"nervous excitement\" and \"I'm a very sentimental person\" about closing this chapter of her life.  Pt states she has some feelings of guilt leaving her work team, but she understands she is not irreplaceable and this will give someone an opportunity.   Pt states she is very exited about getting some much needed household projects done.  Discussed the importance of refraining from feeling guilty for her choice.  Discussed how pt can have connie as well as empathy for others who could not take the deferred resignation, especially her .    Counselor supported " Pt in continued exploration of underlying belief systems which contribute to difficulty in connecting/vulnerability.  Through discussion, Pt identifies themes of preservation and overt emotional and physical reactivity when physical and/or emotional statis is in question, even in low or perceived threatening situations. Counselor supported Pt in identifying past experiences and underlying beliefs which contribute to these feelings and reactions.  Pt was supported and encouraged in processing emotions related to frustrations with the expectations of self and others.  Pt was encouraged to identify cultural and nuclear familial contexts which contribute to these concerns.  Pt was receptive and engaged in conversation, and Pt reports this was beneficial and applicable to their situation.      Reviewed coping skills and encouraged Pt to continue to practicing coping skills daily when not under distress. Pt was praised for their attempts to decrease symptoms and mitigate activating events.    Clinical Maneuvering/Intervention:  CBT was utilized to encourage Pt to identify maladaptive thoughts and behaviors and replace with more affirming and positive.Pt encouraged to set and maintain appropriate and healthy boundaries with others. Pt was instructed to practice daily using appropriate and specific words to communicate feelings to others.  Motivational interviewing used to encourage Pt to identify strengths which can be utilized in working toward treatment goals. Pt encouraged to practice daily learned skills such as controlled breathing, grounding, and mindfulness. Pt was encouraged to ask for help from support persons to assist them in maintaining stability and alleviate symptoms. Discussed the importance of being one's own mental health advocate and to refrain from seeing the need to ask for help as a weakness. Pt was encouraged to formulate a plan of action to be more proactive in managing stressors and refrain from  using reactive and automatic heightened emotional responses.     Solution-focused therapy employed to identify how Pt would like their life to be if they were to make positive changes. Pt encouraged to identify effective coping skills and strengths they can use to continue utilizing those skills. Pt encouraged to discontinue utilizing non-effective coping mechanisms. Pt provided with feedback to highlight achievements and personal and other resources. Encouraged use of SMART goals    Assisted patient in processing above session content; acknowledged and normalized patient’s thoughts, feelings, and concerns.  Rationalized patient thought process regarding concerns presented at session.  Discussed triggers associated with patient's  anxiety  Also discussed coping skills for patient to implement such as mindfulness  and positive self talk     Allowed patient to freely discuss issues without interruption or judgment. Provided safe, confidential environment to facilitate the development of positive therapeutic relationship and encourage open, honest communication. Assisted patient in identifying risk factors which would indicate the need for higher level of care including thoughts to harm self or others and/or self-harming behavior and encouraged patient to contact this office, call 911, or present to the nearest emergency room should any of these events occur. Discussed crisis intervention services and means to access. Patient adamantly and convincingly denies current suicidal or homicidal ideation or perceptual disturbance.    Assessment:     Patient appears to maintain relative stability as compared to their baseline.  However, patient persistently struggles with symptoms which continue to cause impairment in important areas of functioning.  As a result, they can be reasonably expected to continue to benefit from treatment and would likely be at increased risk for decompensation otherwise.        Mental Status Exam:  "  Hygiene:   good  Cooperation:  Cooperative  Eye Contact:  Good  Psychomotor Behavior:  Appropriate  Affect:  Full range \"nervous excitement\"  Mood: anxious  Speech:   \"I'm talking a little fast\"  Thought Process:  Goal directed and Linear  Thought Content:  Normal  Suicidal:  None  Homicidal: None  Hallucinations:  None  Delusion: None  Memory:  Intact  Orientation:  Grossly Intact  Reliability:  good  Insight:  Good  Judgement:  Good  Impulse Control:  Good  Physical/Medical Issues:  No        Functional Status: Mild impairment     Progress toward goal: Not at goal    Prognosis: Good with continued therapeutic intervention        Plan:    Patient will continue in individual outpatient therapy with focus on improved functioning and coping skills, maintaining stability, and avoiding decompensation and the need for higher level of care.    Patient will adhere to medication regimen as prescribed and report any side effects. Patient will contact this office, call 911 or present to the nearest emergency room should suicidal or homicidal ideations occur. Provide Cognitive Behavioral Therapy and Solution Focused Therapy to improve functioning, maintain stability, and avoid decompensation and the need for higher level of care.     Return in about 3 weeks (around 4/11/2025).      VISIT DIAGNOSIS:    Diagnosis Plan   1. Generalized anxiety disorder        2. MDD (major depressive disorder), recurrent episode, moderate         10:04 EDT       This document has been electronically signed by MARY Brown  March 21, 2025      Part of this note may be an electronic transcription/translation of spoken language to printed text using the Dragon Dictation System.  "

## 2025-04-11 ENCOUNTER — TELEMEDICINE (OUTPATIENT)
Dept: PSYCHIATRY | Facility: CLINIC | Age: 50
End: 2025-04-11
Payer: COMMERCIAL

## 2025-04-11 DIAGNOSIS — F41.1 GENERALIZED ANXIETY DISORDER: Primary | ICD-10-CM

## 2025-04-11 NOTE — PROGRESS NOTES
Date: April 13, 2025  Time In: 10:15  Time out: 11:05      This provider is located at the [facility]), [address], [place] [address] using a secure [employer] Video Visit through [employer]. Patient is being seen remotely via telehealth, and they are in a secure environment for this session. The patient's condition being diagnosed/treated is appropriate for telemedicine. The provider identified herself as well as her credentials. The patient, and/or patients guardian, consent to be seen remotely, and when consent is given they understand that the consent allows for patient identifiable information to be sent to a third party as needed. They may refuse to be seen remotely at any time. The electronic data is encrypted and password protected, and the patient and/or guardian has been advised of the potential risks to privacy not withstanding such measures.     Mode of Visit: Video  Location of patient: Home  Location of provider: Provider home  You have chosen to receive care through a telehealth visit.  The patient has signed the video visit consent form.  The visit included audio and video interaction. No technical issues occurred during this visit    Subjective   Alla Hinson is a 49 y.o. female who presents today fully oriented, appropriately dressed and groomed, and open to communication for follow up appointment.    Chief Complaint:   Chief Complaint   Patient presents with    Anxiety        History of Present Illness: Rapport was established through conversation and unconditional positive regard. Recent events were discussed and how these events impact Pt's emotional health.   Pt reports successful use of learned coping skills since last report.  Pt reports continuation of symptoms and states the intensity and duration of symptoms has improved since last report. Pt rates anxiety at 2/10 and depression at 0/10.     Sleep: Pt reports sleep has  "remained unchanged since last report. Healthy sleep habits were discussed such as maintaining a schedule, routine, avoiding caffeine, and limiting screen time before bed.  \"I have been catching up on sleep.\"      Appetite:  Pt reports appetite has been good since last report.  Discussed the importance of hydration and eating a healthy diet for overall and mental health.    Medication compliance: Pt reports medications are being taken daily as prescribed. Discussed the importance of compliance with prescriber's directions and Pt was instructed to report questions/concerns, as well as missed doses or discontinuation of medication by Pt.     Safety Plan in Place: No Pt denies SI/HI/SIB recent or current    Daily Functioning:  Since last report, Pt states symptoms are causing Mild difficulty in daily functioning.      Content Discussion:   Pt reports life updates since previous session. Pt identified current stressors. Pt acknowledged stressors within and outside of one's control. Pt identified successes and issues with utilizing coping mechanisms.  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  Last week was Pt's first week off and Pt's daughter was in [place] for spring break.  Pt viewed the week as a \"mental spring break.\"   Pt states she slept in and decompressed.  \"I haven't got a new normal yet\" with regard to the the weather and vacations.  States she is making \"to do lists for home projects.\" Discussed the need to find her new pace and refrain from feeling compelled to over extend herself.  Discussed the need to set SMART goals and to check off even small daily tasks to ensure a sense of accomplishment.  Pt states she will work on maintaining positive self talk.            Counselor supported Pt in continued exploration of underlying belief " systems which contribute to difficulty in connecting/vulnerability.  Through discussion, Pt identifies themes of preservation and overt emotional and physical reactivity when physical and/or emotional statis is in question, even in low or perceived threatening situations. Counselor supported Pt in identifying past experiences and underlying beliefs which contribute to these feelings and reactions.  Pt was supported and encouraged in processing emotions related to frustrations with the expectations of self and others.  Pt was encouraged to identify cultural and nuclear familial contexts which contribute to these concerns.  Pt was receptive and engaged in conversation, and Pt reports this was beneficial and applicable to their situation.      Reviewed coping skills and encouraged Pt to continue to practicing coping skills daily when not under distress. Pt was praised for their attempts to decrease symptoms and mitigate activating events.    Clinical Maneuvering/Intervention:  CBT was utilized to encourage Pt to identify maladaptive thoughts and behaviors and replace with more affirming and positive.Pt encouraged to set and maintain appropriate and healthy boundaries with others. Pt was instructed to practice daily using appropriate and specific words to communicate feelings to others.  Motivational interviewing used to encourage Pt to identify strengths which can be utilized in working toward treatment goals. Pt encouraged to practice daily learned skills such as controlled breathing, grounding, and mindfulness. Pt was encouraged to ask for help from support persons to assist them in maintaining stability and alleviate symptoms. Discussed the importance of being one's own mental health advocate and to refrain from seeing the need to ask for help as a weakness. Pt was encouraged to formulate a plan of action to be more proactive in managing stressors and refrain from using reactive and automatic heightened emotional  responses.     Solution-focused therapy employed to identify how Pt would like their life to be if they were to make positive changes. Pt encouraged to identify effective coping skills and strengths they can use to continue utilizing those skills. Pt encouraged to discontinue utilizing non-effective coping mechanisms. Pt provided with feedback to highlight achievements and personal and other resources. Encouraged use of SMART goals    Assisted patient in processing above session content; acknowledged and normalized patient’s thoughts, feelings, and concerns.  Rationalized patient thought process regarding concerns presented at session.  Discussed triggers associated with patient's  anxiety  Also discussed coping skills for patient to implement such as self care  and positive self talk     Allowed patient to freely discuss issues without interruption or judgment. Provided safe, confidential environment to facilitate the development of positive therapeutic relationship and encourage open, honest communication. Assisted patient in identifying risk factors which would indicate the need for higher level of care including thoughts to harm self or others and/or self-harming behavior and encouraged patient to contact this office, call 911, or present to the nearest emergency room should any of these events occur. Discussed crisis intervention services and means to access. Patient adamantly and convincingly denies current suicidal or homicidal ideation or perceptual disturbance.    Assessment:     Patient appears to maintain relative stability as compared to their baseline.  However, patient persistently struggles with symptoms which continue to cause impairment in important areas of functioning.  As a result, they can be reasonably expected to continue to benefit from treatment and would likely be at increased risk for decompensation otherwise.      PHQ-Score Total:  PHQ-9 Total Score: PHQ-9 Depression Screening  Little  interest or pleasure in doing things?  0   Feeling down, depressed, or hopeless?  0   PHQ-2 Total Score     Trouble falling or staying asleep, or sleeping too much?     Feeling tired or having little energy?     Poor appetite or overeating?     Feeling bad about yourself - or that you are a failure or have let yourself or your family down?     Trouble concentrating on things, such as reading the newspaper or watching television?     Moving or speaking so slowly that other people could have noticed? Or the opposite - being so fidgety or restless that you have been moving around a lot more than usual?     Thoughts that you would be better off dead, or of hurting yourself in some way?     PHQ-9 Total Score  0   If you checked off any problems, how difficult have these problems made it for you to do your work, take care of things at home, or get along with other people?  None             Mental Status Exam:   Hygiene:   good  Cooperation:  Cooperative  Eye Contact:  Good  Psychomotor Behavior:  Appropriate  Affect:  Full range  Mood: normal  Speech:  Normal  Thought Process:  Goal directed  Thought Content:  Normal  Suicidal:  None  Homicidal: None  Hallucinations:  None  Delusion: None  Memory:  Intact  Orientation:  Grossly Intact  Reliability:  good  Insight:  Good  Judgement:  Good  Impulse Control:  Good  Physical/Medical Issues:  No        Functional Status: Mild impairment     Progress toward goal: Not at goal    Prognosis: Good with continued therapeutic intervention        Plan:    Patient will continue in individual outpatient therapy with focus on improved functioning and coping skills, maintaining stability, and avoiding decompensation and the need for higher level of care.    Patient will adhere to medication regimen as prescribed and report any side effects. Patient will contact this office, call 911 or present to the nearest emergency room should suicidal or homicidal ideations occur. Provide Cognitive  Behavioral Therapy and Solution Focused Therapy to improve functioning, maintain stability, and avoid decompensation and the need for higher level of care.     Return in about 2 weeks (around 4/25/2025).      VISIT DIAGNOSIS:    Diagnosis Plan   1. Generalized anxiety disorder         10:15 EDT       This document has been electronically signed by MARY Brown  April 13, 2025      Part of this note may be an electronic transcription/translation of spoken language to printed text using the Dragon Dictation System.

## 2025-04-25 ENCOUNTER — TELEMEDICINE (OUTPATIENT)
Dept: PSYCHIATRY | Facility: CLINIC | Age: 50
End: 2025-04-25
Payer: COMMERCIAL

## 2025-04-25 NOTE — PROGRESS NOTES
"Date: April 25, 2025  Time In: 10:09  Time out: 11:08      This provider is located at the [facility]), [address], [place] [address] using a secure [employer] Video Visit through [email or web address]. Patient is being seen remotely via telehealth, and they are in a secure environment for this session. The patient's condition being diagnosed/treated is appropriate for telemedicine. The provider identified herself as well as her credentials. The patient, and/or patients guardian, consent to be seen remotely, and when consent is given they understand that the consent allows for patient identifiable information to be sent to a third party as needed. They may refuse to be seen remotely at any time. The electronic data is encrypted and password protected, and the patient and/or guardian has been advised of the potential risks to privacy not withstanding such measures.     Mode of Visit: Video  Location of patient: Home  Location of provider: Provider home  You have chosen to receive care through a telehealth visit.  The patient has signed the video visit consent form.  The visit included audio and video interaction. No technical issues occurred during this visit    Subjective   Alla Hinson is a 49 y.o. female who presents today fully oriented, appropriately dressed and groomed, and open to communication for follow up appointment.    Chief Complaint: No chief complaint on file.       History of Present Illness: Rapport was established through conversation and unconditional positive regard. Recent events were discussed and how these events impact Pt's emotional health.   Pt reports successful use of learned coping skills since last report.  Pt reports continuation of symptoms and states the intensity and duration of symptoms has worsened \"just a touch\" since last report. Pt rates anxiety at 5/10 and depression at 4/10.     Sleep: Pt reports sleep has remained " "unchanged since last report. Healthy sleep habits were discussed such as maintaining a schedule, routine, avoiding caffeine, and limiting screen time before bed.  States sleep is good and restorative.    Appetite:  Pt reports appetite has been good since last report.  Discussed the importance of hydration and eating a healthy diet for overall and mental health.    Medication compliance: Pt reports medications are being taken daily as prescribed. Discussed the importance of compliance with prescriber's directions and Pt was instructed to report questions/concerns, as well as missed doses or discontinuation of medication by Pt.     Safety Plan in Place: No Pt denies SI/HI/SIB recent or current    Daily Functioning:  Since last report, Pt states symptoms are causing Moderate difficulty in daily functioning.      Content Discussion:   Pt reports life updates since previous session. Pt identified current stressors. Pt acknowledged stressors within and outside of one's control. Pt identified successes and issues with utilizing coping mechanisms.  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  The time is going by really quickly.  States she is feeling some social anxiety.  States she is embarrassed to talk to people about  her work situation.  States she gets anxious to talk about it with family and friends.  Tends to get a little irritable with family because they are pressuring her to know what she plans to do when she goes back to work.  States she had parents and in laws for dinner for daughter's event and Pt felt she was \"quizzed\" over her work situation.  Discussed breaking down tasks.  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 Pt reports she is having great difficult getting this started.  Discussed listing 10 items and doing her own beta test to see if this is feasible and " something she wants to do before setting everything up for a larger scale business.  Pt states she thinks this will be a helpful strategy not only in the online selling but also in other projects she wants to get done while she is off work. Pt states she feels a sense of urgency to use this time off wisely and get the most out of it.  Discuss using mindfulness.     Counselor supported Pt in continued exploration of underlying belief systems which contribute to difficulty in connecting/vulnerability.  Through discussion, Pt identifies themes of preservation and overt emotional and physical reactivity when physical and/or emotional statis is in question, even in low or perceived threatening situations. Counselor supported Pt in identifying past experiences and underlying beliefs which contribute to these feelings and reactions.  Pt was supported and encouraged in processing emotions related to frustrations with the expectations of self and others.  Pt was encouraged to identify cultural and nuclear familial contexts which contribute to these concerns.  Pt was receptive and engaged in conversation, and Pt reports this was beneficial and applicable to their situation.      Reviewed coping skills and encouraged Pt to continue to practicing coping skills daily when not under distress. Pt was praised for their attempts to decrease symptoms and mitigate activating events.    Clinical Maneuvering/Intervention:  CBT was utilized to encourage Pt to identify maladaptive thoughts and behaviors and replace with more affirming and positive.Pt encouraged to set and maintain appropriate and healthy boundaries with others. Pt was instructed to practice daily using appropriate and specific words to communicate feelings to others.  Motivational interviewing used to encourage Pt to identify strengths which can be utilized in working toward treatment goals. Pt encouraged to practice daily learned skills such as controlled breathing,  grounding, and mindfulness. Pt was encouraged to ask for help from support persons to assist them in maintaining stability and alleviate symptoms. Discussed the importance of being one's own mental health advocate and to refrain from seeing the need to ask for help as a weakness. Pt was encouraged to formulate a plan of action to be more proactive in managing stressors and refrain from using reactive and automatic heightened emotional responses.     Solution-focused therapy employed to identify how Pt would like their life to be if they were to make positive changes. Pt encouraged to identify effective coping skills and strengths they can use to continue utilizing those skills. Pt encouraged to discontinue utilizing non-effective coping mechanisms. Pt provided with feedback to highlight achievements and personal and other resources. Encouraged use of SMART goals    Assisted patient in processing above session content; acknowledged and normalized patient’s thoughts, feelings, and concerns.  Rationalized patient thought process regarding concerns presented at session.  Discussed triggers associated with patient's  anxiety  and depression  Also discussed coping skills for patient to implement such as increasing activity , self care , and positive self talk     Allowed patient to freely discuss issues without interruption or judgment. Provided safe, confidential environment to facilitate the development of positive therapeutic relationship and encourage open, honest communication. Assisted patient in identifying risk factors which would indicate the need for higher level of care including thoughts to harm self or others and/or self-harming behavior and encouraged patient to contact this office, call 911, or present to the nearest emergency room should any of these events occur. Discussed crisis intervention services and means to access. Patient adamantly and convincingly denies current suicidal or homicidal ideation or  perceptual disturbance.    Assessment:     Patient appears to maintain relative stability as compared to their baseline.  However, patient persistently struggles with symptoms which continue to cause impairment in important areas of functioning.  As a result, they can be reasonably expected to continue to benefit from treatment and would likely be at increased risk for decompensation otherwise.      Mental Status Exam:   Hygiene:   good  Cooperation:  Cooperative  Eye Contact:  Good  Psychomotor Behavior:  Restless  Affect:   A little more frustrated than usual , a sense of pressure  Mood: anxious   Speech:  Normal  Thought Process:  Pressured by anxiety  Thought Content:  Normal  Suicidal:  None  Homicidal: None  Hallucinations:  None  Delusion: None  Memory:  Intact  Orientation:  Grossly Intact  Reliability:  good  Insight:  Good  Judgement:  Good  Impulse Control:  Good  Physical/Medical Issues:  No        Functional Status: Mild impairment     Progress toward goal: Not at goal    Prognosis: Good with continued therapeutic intervention        Plan:    Patient will continue in individual outpatient therapy with focus on improved functioning and coping skills, maintaining stability, and avoiding decompensation and the need for higher level of care.    Patient will adhere to medication regimen as prescribed and report any side effects. Patient will contact this office, call 911 or present to the nearest emergency room should suicidal or homicidal ideations occur. Provide Cognitive Behavioral Therapy and Solution Focused Therapy to improve functioning, maintain stability, and avoid decompensation and the need for higher level of care.     Return in about 2 weeks (around 5/9/2025).      VISIT DIAGNOSIS:   No diagnosis found. 10:09 EDT       This document has been electronically signed by MARY Brown  April 25, 2025      Part of this note may be an electronic transcription/translation of spoken language to  printed text using the Dragon Dictation System.

## 2025-06-17 ENCOUNTER — TELEMEDICINE (OUTPATIENT)
Dept: PSYCHIATRY | Facility: CLINIC | Age: 50
End: 2025-06-17
Payer: COMMERCIAL

## 2025-06-17 DIAGNOSIS — F41.1 GENERALIZED ANXIETY DISORDER: Primary | ICD-10-CM

## 2025-06-17 DIAGNOSIS — F33.1 MDD (MAJOR DEPRESSIVE DISORDER), RECURRENT EPISODE, MODERATE: ICD-10-CM

## 2025-06-17 NOTE — PROGRESS NOTES
Date: June 18, 2025  Time In: 9:02  Time out: 10:01      This provider is located at the [facility] ([facility]), [address], [place] [address] using a secure [employer] Video Visit through [employer]. Patient is being seen remotely via telehealth, and they are in a secure environment for this session. The patient's condition being diagnosed/treated is appropriate for telemedicine. The provider identified herself as well as her credentials. The patient, and/or patients guardian, consent to be seen remotely, and when consent is given they understand that the consent allows for patient identifiable information to be sent to a third party as needed. They may refuse to be seen remotely at any time. The electronic data is encrypted and password protected, and the patient and/or guardian has been advised of the potential risks to privacy not withstanding such measures.     Mode of Visit: Video  Location of patient: Home  Location of provider: Provider home  You have chosen to receive care through a telehealth visit.  The patient has signed the video visit consent form.  The visit included audio and video interaction. No technical issues occurred during this visit    Subjective   Alla Hinson is a 49 y.o. female who presents today fully oriented, appropriately dressed and groomed, and open to communication for follow up appointment.    Chief Complaint:   Chief Complaint   Patient presents with    Anxiety    Depression        History of Present Illness: Rapport was established through conversation and unconditional positive regard. Recent events were discussed and how these events impact Pt's emotional health.   Pt reports successful use of learned coping skills since last report.  Pt reports continuation of symptoms and states the intensity and duration of symptoms has remained unchanged since last report. Pt rates anxiety at 5/10 and depression at 3/10.     Sleep: Pt  "reports sleep has remained unchanged since last report. Healthy sleep habits were discussed such as maintaining a schedule, routine, avoiding caffeine, and limiting screen time before bed.    Appetite:  Pt reports appetite has been good since last report.  Discussed the importance of hydration and eating a healthy diet for overall and mental health.    Medication compliance: Pt reports medications are being taken daily as prescribed. Discussed the importance of compliance with prescriber's directions and Pt was instructed to report questions/concerns, as well as missed doses or discontinuation of medication by Pt.  Pt states she has gone off the anti depressant/anti anxiety around January due to stomach upset.    Safety Plan in Place: No Pt denies SI/HI/SIB recent or current    Daily Functioning:  Since last report, Pt states symptoms are causing Mild difficulty in daily functioning.      Content Discussion:   Pt reports life updates since previous session. Pt identified current stressors. Pt acknowledged stressors within and outside of one's control. Pt identified successes and issues with utilizing coping mechanisms.  Pt has been struggling with procrastination. 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  Pt states she is anxious because she continues to go to [email or web address] stores and buying items for sale.  Discussed task paralysis and Pt made a strategy to break down steps to list one item today.   Pt states she is worried about allowing too much clutter into her office area.  Pt encouraged to do one task to completion each day.  Pt gave herself a deadline, as deadlines help her feel a sense of urgency.  Pt states she will write a note and put in her car that reads \"instead of\" reminding that she needs to sell the things she has instead of stopping at the [employer] store and getting more things.  States \"I will make purposeful decisions.:    Counselor " supported Pt in continued exploration of underlying belief systems which contribute to difficulty in connecting/vulnerability.  Through discussion, Pt identifies themes of preservation and overt emotional and physical reactivity when physical and/or emotional statis is in question, even in low or perceived threatening situations. Counselor supported Pt in identifying past experiences and underlying beliefs which contribute to these feelings and reactions.  Pt was supported and encouraged in processing emotions related to frustrations with the expectations of self and others.  Pt was encouraged to identify cultural and nuclear familial contexts which contribute to these concerns.  Pt was receptive and engaged in conversation, and Pt reports this was beneficial and applicable to their situation.      Reviewed coping skills and encouraged Pt to continue to practicing coping skills daily when not under distress. Pt was praised for their attempts to decrease symptoms and mitigate activating events.    Clinical Maneuvering/Intervention:  CBT was utilized to encourage Pt to identify maladaptive thoughts and behaviors and replace with more affirming and positive.Pt encouraged to set and maintain appropriate and healthy boundaries with others. Pt was instructed to practice daily using appropriate and specific words to communicate feelings to others.  Motivational interviewing used to encourage Pt to identify strengths which can be utilized in working toward treatment goals. Pt encouraged to practice daily learned skills such as controlled breathing, grounding, and mindfulness. Pt was encouraged to ask for help from support persons to assist them in maintaining stability and alleviate symptoms. Discussed the importance of being one's own mental health advocate and to refrain from seeing the need to ask for help as a weakness. Pt was encouraged to formulate a plan of action to be more proactive in managing stressors and  refrain from using reactive and automatic heightened emotional responses.     Solution-focused therapy employed to identify how Pt would like their life to be if they were to make positive changes. Pt encouraged to identify effective coping skills and strengths they can use to continue utilizing those skills. Pt encouraged to discontinue utilizing non-effective coping mechanisms. Pt provided with feedback to highlight achievements and personal and other resources. Encouraged use of SMART goals    Assisted patient in processing above session content; acknowledged and normalized patient’s thoughts, feelings, and concerns.  Rationalized patient thought process regarding concerns presented at session.  Discussed triggers associated with patient's  anxiety  and depression  Also discussed coping skills for patient to implement such as mindfulness , self care , and positive self talk     Allowed patient to freely discuss issues without interruption or judgment. Provided safe, confidential environment to facilitate the development of positive therapeutic relationship and encourage open, honest communication. Assisted patient in identifying risk factors which would indicate the need for higher level of care including thoughts to harm self or others and/or self-harming behavior and encouraged patient to contact this office, call 911, or present to the nearest emergency room should any of these events occur. Discussed crisis intervention services and means to access. Patient adamantly and convincingly denies current suicidal or homicidal ideation or perceptual disturbance.    Assessment:     Patient appears to maintain relative stability as compared to their baseline.  However, patient persistently struggles with symptoms which continue to cause impairment in important areas of functioning.  As a result, they can be reasonably expected to continue to benefit from treatment and would likely be at increased risk for  "decompensation otherwise.      Mental Status Exam:   Hygiene:   good  Cooperation:  Cooperative  Eye Contact:  Good  Psychomotor Behavior:  Appropriate  Affect:  \"a little on edge\"   Mood: depressed and anxious  Speech:  Normal  Thought Process:  Goal directed  Thought Content:  Normal  Suicidal:  None  Homicidal: None  Hallucinations:  None  Delusion: None  Memory:  Intact  Orientation:  Grossly Intact  Reliability:  good  Insight:  Good  Judgement:  Good  Impulse Control:  Good  Physical/Medical Issues:  No        Functional Status: Mild impairment     Progress toward goal: Not at goal    Prognosis: Good with continued therapeutic intervention        Plan:    Patient will continue in individual outpatient therapy with focus on improved functioning and coping skills, maintaining stability, and avoiding decompensation and the need for higher level of care.    Patient will adhere to medication regimen as prescribed and report any side effects. Patient will contact this office, call 911 or present to the nearest emergency room should suicidal or homicidal ideations occur. Provide Cognitive Behavioral Therapy and Solution Focused Therapy to improve functioning, maintain stability, and avoid decompensation and the need for higher level of care.     Return in about 2 weeks (around 7/1/2025).      VISIT DIAGNOSIS:    Diagnosis Plan   1. Generalized anxiety disorder        2. MDD (major depressive disorder), recurrent episode, moderate         09:05 EDT       This document has been electronically signed by MARY Brown  June 18, 2025      Part of this note may be an electronic transcription/translation of spoken language to printed text using the Dragon Dictation System.  "

## 2025-07-15 ENCOUNTER — TELEMEDICINE (OUTPATIENT)
Dept: PSYCHIATRY | Facility: CLINIC | Age: 50
End: 2025-07-15
Payer: COMMERCIAL

## 2025-07-15 DIAGNOSIS — F41.1 GENERALIZED ANXIETY DISORDER: Primary | ICD-10-CM

## 2025-07-15 DIAGNOSIS — F33.1 MDD (MAJOR DEPRESSIVE DISORDER), RECURRENT EPISODE, MODERATE: ICD-10-CM

## 2025-07-15 NOTE — PROGRESS NOTES
Date: July 16, 2025  Time In: 9:04  Time out: 10:01      This provider is located at the [facility] ), [address], [place] [address] using a secure i[employer] Video Visit through [employer]. Patient is being seen remotely via telehealth, and they are in a secure environment for this session. The patient's condition being diagnosed/treated is appropriate for telemedicine. The provider identified herself as well as her credentials. The patient, and/or patients guardian, consent to be seen remotely, and when consent is given they understand that the consent allows for patient identifiable information to be sent to a third party as needed. They may refuse to be seen remotely at any time. The electronic data is encrypted and password protected, and the patient and/or guardian has been advised of the potential risks to privacy not withstanding such measures.     Mode of Visit: Video  Location of patient: home  Location of provider: Provider home  You have chosen to receive care through a telehealth visit.  The patient has signed the video visit consent form.  The visit included audio and video interaction. No technical issues occurred during this visit    Subjective   Alla Hinson is a 49 y.o. female who presents today fully oriented, appropriately dressed and groomed, and open to communication for follow up appointment.    Chief Complaint:   Chief Complaint   Patient presents with    Anxiety    Depression        History of Present Illness: Rapport was established through conversation and unconditional positive regard. Recent events were discussed and how these events impact Pt's emotional health.   Pt reports successful use of learned coping skills since last report.  Pt reports continuation of symptoms and states the intensity and duration of symptoms has improved since last report. Pt rates anxiety at 4/10 and depression at 2/10.     Sleep: Pt reports  "sleep has improved since last report. Healthy sleep habits were discussed such as maintaining a schedule, routine, avoiding caffeine, and limiting screen time before bed.    Appetite:  Pt reports appetite has been good since last report.  Discussed the importance of hydration and eating a healthy diet for overall and mental health.    Medication compliance: Pt reports medications are being taken daily as prescribed. Discussed the importance of compliance with prescriber's directions and Pt was instructed to report questions/concerns, as well as missed doses or discontinuation of medication by Pt.     Safety Plan in Place: No Pt denies SI/HI/SIB recent or current    Daily Functioning:  Since last report, Pt states symptoms are causing Mild difficulty in daily functioning.      Content Discussion:   Pt reports life updates since previous session. Pt identified current stressors. Pt acknowledged stressors within and outside of one's control. Pt identified successes and issues with utilizing coping mechanisms.  Pt states they have been taking some trips including a family trip to [place], and it was wonderful.  \"That was really fun.\"  Pt states she also went to [place] this past weekend to a very good friend's wedding.  The wedding was in the [facility] and Pt went with a mutual friend and it was very enjoyable.  Pt states her main concern now is task paralysis and putting things off.   Pt states \"I don't feel anxious but my brain can't focus.\"  Pt states she has also trying to make herself available for her younger daughter, and they have been spending the summer together a lot.  Pt states she is working on focusing on quality time rather than quantity. 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  Pt repots she feels getting her daughters involved in this is the motivator she needs.     Counselor supported Pt in continued " exploration of underlying belief systems which contribute to difficulty in connecting/vulnerability.  Through discussion, Pt identifies themes of preservation and overt emotional and physical reactivity when physical and/or emotional statis is in question, even in low or perceived threatening situations. Counselor supported Pt in identifying past experiences and underlying beliefs which contribute to these feelings and reactions.  Pt was supported and encouraged in processing emotions related to frustrations with the expectations of self and others.  Pt was encouraged to identify cultural and nuclear familial contexts which contribute to these concerns.  Pt was receptive and engaged in conversation, and Pt reports this was beneficial and applicable to their situation.      Reviewed coping skills and encouraged Pt to continue to practicing coping skills daily when not under distress. Pt was praised for their attempts to decrease symptoms and mitigate activating events.    Clinical Maneuvering/Intervention:  CBT was utilized to encourage Pt to identify maladaptive thoughts and behaviors and replace with more affirming and positive.Pt encouraged to set and maintain appropriate and healthy boundaries with others. Pt was instructed to practice daily using appropriate and specific words to communicate feelings to others.  Motivational interviewing used to encourage Pt to identify strengths which can be utilized in working toward treatment goals. Pt encouraged to practice daily learned skills such as controlled breathing, grounding, and mindfulness. Pt was encouraged to ask for help from support persons to assist them in maintaining stability and alleviate symptoms. Discussed the importance of being one's own mental health advocate and to refrain from seeing the need to ask for help as a weakness. Pt was encouraged to formulate a plan of action to be more proactive in managing stressors and refrain from using reactive and  automatic heightened emotional responses.     Solution-focused therapy employed to identify how Pt would like their life to be if they were to make positive changes. Pt encouraged to identify effective coping skills and strengths they can use to continue utilizing those skills. Pt encouraged to discontinue utilizing non-effective coping mechanisms. Pt provided with feedback to highlight achievements and personal and other resources. Encouraged use of SMART goals    Assisted patient in processing above session content; acknowledged and normalized patient’s thoughts, feelings, and concerns.  Rationalized patient thought process regarding concerns presented at session.  Discussed triggers associated with patient's  anxiety  and depression  Also discussed coping skills for patient to implement such as grounding , self care , and positive self talk     Allowed patient to freely discuss issues without interruption or judgment. Provided safe, confidential environment to facilitate the development of positive therapeutic relationship and encourage open, honest communication. Assisted patient in identifying risk factors which would indicate the need for higher level of care including thoughts to harm self or others and/or self-harming behavior and encouraged patient to contact this office, call 911, or present to the nearest emergency room should any of these events occur. Discussed crisis intervention services and means to access. Patient adamantly and convincingly denies current suicidal or homicidal ideation or perceptual disturbance.    Assessment:     Patient appears to maintain relative stability as compared to their baseline.  However, patient persistently struggles with symptoms which continue to cause impairment in important areas of functioning.  As a result, they can be reasonably expected to continue to benefit from treatment and would likely be at increased risk for decompensation otherwise.        Mental Status  Exam:   Hygiene:   good  Cooperation:  Cooperative  Eye Contact:  Good  Psychomotor Behavior:  Appropriate  Affect:  Full range  Mood: normal  Speech:  Normal  Thought Process:  Goal directed  Thought Content:  Normal  Suicidal:  None  Homicidal: None  Hallucinations:  None  Delusion: None  Memory:  Intact  Orientation:  Grossly Intact  Reliability:  good  Insight:  Good  Judgement:  Good  Impulse Control:  Good  Physical/Medical Issues:  No        Functional Status: Mild impairment     Progress toward goal: Not at goal    Prognosis: Good with continued therapeutic intervention        Plan:    Patient will continue in individual outpatient therapy with focus on improved functioning and coping skills, maintaining stability, and avoiding decompensation and the need for higher level of care.    Patient will adhere to medication regimen as prescribed and report any side effects. Patient will contact this office, call 911 or present to the nearest emergency room should suicidal or homicidal ideations occur. Provide Cognitive Behavioral Therapy and Solution Focused Therapy to improve functioning, maintain stability, and avoid decompensation and the need for higher level of care.     Return in about 2 weeks (around 7/29/2025).      VISIT DIAGNOSIS:    Diagnosis Plan   1. Generalized anxiety disorder        2. MDD (major depressive disorder), recurrent episode, moderate         09:05 EDT       This document has been electronically signed by MARY Brown  July 16, 2025      Part of this note may be an electronic transcription/translation of spoken language to printed text using the Dragon Dictation System.

## 2025-08-26 ENCOUNTER — TELEMEDICINE (OUTPATIENT)
Dept: PSYCHIATRY | Facility: CLINIC | Age: 50
End: 2025-08-26
Payer: COMMERCIAL

## 2025-08-26 DIAGNOSIS — F33.1 MDD (MAJOR DEPRESSIVE DISORDER), RECURRENT EPISODE, MODERATE: ICD-10-CM

## 2025-08-26 DIAGNOSIS — F41.1 GENERALIZED ANXIETY DISORDER: Primary | ICD-10-CM
